# Patient Record
Sex: MALE | Race: WHITE | Employment: OTHER | URBAN - METROPOLITAN AREA
[De-identification: names, ages, dates, MRNs, and addresses within clinical notes are randomized per-mention and may not be internally consistent; named-entity substitution may affect disease eponyms.]

---

## 2017-01-31 ENCOUNTER — MED REC SCAN ONLY (OUTPATIENT)
Dept: FAMILY MEDICINE CLINIC | Facility: CLINIC | Age: 57
End: 2017-01-31

## 2017-02-10 ENCOUNTER — OFFICE VISIT (OUTPATIENT)
Dept: FAMILY MEDICINE CLINIC | Facility: CLINIC | Age: 57
End: 2017-02-10

## 2017-02-10 ENCOUNTER — APPOINTMENT (OUTPATIENT)
Dept: LAB | Age: 57
End: 2017-02-10
Attending: FAMILY MEDICINE
Payer: COMMERCIAL

## 2017-02-10 VITALS
HEART RATE: 96 BPM | RESPIRATION RATE: 16 BRPM | DIASTOLIC BLOOD PRESSURE: 72 MMHG | TEMPERATURE: 98 F | HEIGHT: 68 IN | BODY MASS INDEX: 38.84 KG/M2 | WEIGHT: 256.25 LBS | SYSTOLIC BLOOD PRESSURE: 110 MMHG

## 2017-02-10 DIAGNOSIS — K21.9 GASTROESOPHAGEAL REFLUX DISEASE WITHOUT ESOPHAGITIS: ICD-10-CM

## 2017-02-10 DIAGNOSIS — E29.1 MALE HYPOGONADISM: ICD-10-CM

## 2017-02-10 DIAGNOSIS — M23.91 INTERNAL DERANGEMENT OF KNEE, RIGHT: ICD-10-CM

## 2017-02-10 DIAGNOSIS — E11.9 CONTROLLED TYPE 2 DIABETES MELLITUS WITHOUT COMPLICATION, WITHOUT LONG-TERM CURRENT USE OF INSULIN (HCC): ICD-10-CM

## 2017-02-10 DIAGNOSIS — I10 BENIGN ESSENTIAL HYPERTENSION: Primary | ICD-10-CM

## 2017-02-10 DIAGNOSIS — I10 BENIGN ESSENTIAL HYPERTENSION: ICD-10-CM

## 2017-02-10 DIAGNOSIS — G47.33 OBSTRUCTIVE SLEEP APNEA: ICD-10-CM

## 2017-02-10 DIAGNOSIS — E78.49 FAMILIAL MULTIPLE LIPOPROTEIN-TYPE HYPERLIPIDEMIA: ICD-10-CM

## 2017-02-10 LAB
ALBUMIN SERPL-MCNC: 4.1 G/DL (ref 3.5–4.8)
ALP LIVER SERPL-CCNC: 43 U/L (ref 45–117)
ALT SERPL-CCNC: 31 U/L (ref 17–63)
AST SERPL-CCNC: 13 U/L (ref 15–41)
BILIRUB SERPL-MCNC: 0.4 MG/DL (ref 0.1–2)
BUN BLD-MCNC: 20 MG/DL (ref 8–20)
CALCIUM BLD-MCNC: 9.6 MG/DL (ref 8.3–10.3)
CHLORIDE: 104 MMOL/L (ref 101–111)
CO2: 28 MMOL/L (ref 22–32)
CREAT BLD-MCNC: 1.29 MG/DL (ref 0.7–1.3)
EST. AVERAGE GLUCOSE BLD GHB EST-MCNC: 131 MG/DL (ref 68–126)
GLUCOSE BLD-MCNC: 112 MG/DL (ref 70–99)
HBA1C MFR BLD HPLC: 6.2 % (ref ?–5.7)
M PROTEIN MFR SERPL ELPH: 7.3 G/DL (ref 6.1–8.3)
POTASSIUM SERPL-SCNC: 4.1 MMOL/L (ref 3.6–5.1)
SODIUM SERPL-SCNC: 140 MMOL/L (ref 136–144)
TESTOSTERONE: 184.9 NG/DL (ref 241–827)

## 2017-02-10 PROCEDURE — 84403 ASSAY OF TOTAL TESTOSTERONE: CPT

## 2017-02-10 PROCEDURE — 83036 HEMOGLOBIN GLYCOSYLATED A1C: CPT

## 2017-02-10 PROCEDURE — 80053 COMPREHEN METABOLIC PANEL: CPT

## 2017-02-10 PROCEDURE — 99214 OFFICE O/P EST MOD 30 MIN: CPT | Performed by: FAMILY MEDICINE

## 2017-02-10 RX ORDER — TESTOSTERONE 12.5 MG/1.25G
50 GEL TOPICAL DAILY
COMMUNITY
Start: 2016-07-22 | End: 2017-02-11

## 2017-02-10 RX ORDER — ESOMEPRAZOLE MAGNESIUM 40 MG/1
1 CAPSULE, DELAYED RELEASE ORAL DAILY
COMMUNITY
Start: 2015-03-20 | End: 2017-05-01

## 2017-02-10 RX ORDER — TAMSULOSIN HYDROCHLORIDE 0.4 MG/1
1 CAPSULE ORAL DAILY
COMMUNITY
Start: 2016-07-20 | End: 2017-08-14

## 2017-02-10 RX ORDER — TADALAFIL 20 MG/1
1 TABLET ORAL AS DIRECTED
COMMUNITY
Start: 2008-12-09 | End: 2017-06-17

## 2017-02-10 RX ORDER — METOPROLOL SUCCINATE 50 MG/1
1 TABLET, EXTENDED RELEASE ORAL DAILY
COMMUNITY
Start: 2008-08-26 | End: 2017-08-18

## 2017-02-10 RX ORDER — AMLODIPINE BESYLATE 10 MG/1
1 TABLET ORAL DAILY
COMMUNITY
Start: 2013-06-19 | End: 2017-12-06

## 2017-02-10 RX ORDER — VALSARTAN AND HYDROCHLOROTHIAZIDE 320; 12.5 MG/1; MG/1
1 TABLET, FILM COATED ORAL DAILY
COMMUNITY
Start: 2017-01-11 | End: 2017-12-06

## 2017-02-10 RX ORDER — LANCETS
3 EACH MISCELLANEOUS DAILY
COMMUNITY
Start: 2016-06-22 | End: 2017-08-14

## 2017-02-10 RX ORDER — SIMVASTATIN 40 MG
1 TABLET ORAL DAILY
COMMUNITY
Start: 2007-08-29 | End: 2017-12-06

## 2017-02-11 RX ORDER — TESTOSTERONE 12.5 MG/1.25G
50 GEL TOPICAL DAILY
Qty: 90 PACKAGE | Refills: 3 | Status: SHIPPED
Start: 2017-02-11 | End: 2017-08-21

## 2017-02-11 NOTE — PROGRESS NOTES
[address]  PROGRESS NOTE  Chief Complaint:   Patient presents with: Follow - Up      HPI:   This is a 61year old male coming in for follow-up of his diabetes. He has been feeling pretty good overall.     He said that during November a E11.9 Non Insulin Dependent Disp:  Rfl:    ACCU-CHEK FASTCLIX LANCETS Does not apply Misc 3 lancet by Finger stick route daily. 3x Daily Glucose Testing.   Dx: E11.9  Non Insulin Dependent Disp:  Rfl:    MetFORMIN HCl 500 MG Oral Tab Take 1 tablet by mouth or anxiety. ENDOCRINOLOGIC:  Denies excessive sweating, cold or heat intolerance, polyuria or polydipsia. He is not had any low blood sugars.     EXAM:   /72 mmHg  Pulse 96  Temp(Src) 98.1 °F (36.7 °C) (Temporal)  Resp 16  Ht 68\"  Wt 256 lb 4 oz  B his metformin regularly. I have recommended that we check his hemoglobin A1c today. - Hgba1c; Future    5. Male hypogonadism  He does have low testosterone. He has been using his testosterone supplement faithfully.   It is time to recheck his testosteron

## 2017-02-13 ENCOUNTER — TELEPHONE (OUTPATIENT)
Dept: FAMILY MEDICINE CLINIC | Facility: CLINIC | Age: 57
End: 2017-02-13

## 2017-02-13 NOTE — TELEPHONE ENCOUNTER
----- Message from Jyothi Macdonald MD sent at 2/11/2017 11:54 AM CST -----  Please call Chantel Knox. His hemoglobin A1c is normal at 6.2.   This is good news and means that his diabetes control is actually okay in spite of missing metformin for a little while

## 2017-02-25 ENCOUNTER — TELEPHONE (OUTPATIENT)
Dept: FAMILY MEDICINE CLINIC | Facility: CLINIC | Age: 57
End: 2017-02-25

## 2017-02-25 NOTE — TELEPHONE ENCOUNTER
Thomas Alex informed 1 Year refill Metformin sent on 2/20. [facility] shows Rx filled on 2/20. Informed will need to speak with pharmacy/agrees.   Chau Adams, 02/25/2017, 10:48 AM

## 2017-03-15 ENCOUNTER — MED REC SCAN ONLY (OUTPATIENT)
Dept: FAMILY MEDICINE CLINIC | Facility: CLINIC | Age: 57
End: 2017-03-15

## 2017-03-20 ENCOUNTER — TELEPHONE (OUTPATIENT)
Dept: FAMILY MEDICINE CLINIC | Facility: CLINIC | Age: 57
End: 2017-03-20

## 2017-03-21 ENCOUNTER — TELEPHONE (OUTPATIENT)
Dept: FAMILY MEDICINE CLINIC | Facility: CLINIC | Age: 57
End: 2017-03-21

## 2017-03-21 NOTE — TELEPHONE ENCOUNTER
M/L on VM Prior Approval for Medication Approved Yesterday. Informed can  Rx at Pharmacy Today.   Que Dye, 03/21/2017, 10:04 AM

## 2017-05-01 RX ORDER — ESOMEPRAZOLE MAGNESIUM 40 MG/1
CAPSULE, DELAYED RELEASE ORAL
Qty: 90 CAPSULE | Refills: 3 | Status: SHIPPED | OUTPATIENT
Start: 2017-05-01 | End: 2017-05-03

## 2017-05-03 RX ORDER — ESOMEPRAZOLE MAGNESIUM 40 MG/1
CAPSULE, DELAYED RELEASE ORAL
Qty: 30 CAPSULE | Refills: 11 | Status: SHIPPED | OUTPATIENT
Start: 2017-05-03 | End: 2018-01-19

## 2017-06-12 ENCOUNTER — TELEPHONE (OUTPATIENT)
Dept: FAMILY MEDICINE CLINIC | Facility: CLINIC | Age: 57
End: 2017-06-12

## 2017-06-12 DIAGNOSIS — I10 HTN (HYPERTENSION), BENIGN: ICD-10-CM

## 2017-06-12 DIAGNOSIS — E11.9 CONTROLLED TYPE 2 DIABETES MELLITUS WITHOUT COMPLICATION, WITHOUT LONG-TERM CURRENT USE OF INSULIN (HCC): Primary | ICD-10-CM

## 2017-06-17 RX ORDER — TADALAFIL 20 MG
TABLET ORAL
Qty: 10 TABLET | Refills: 11 | Status: SHIPPED | OUTPATIENT
Start: 2017-06-17 | End: 2017-06-18

## 2017-06-19 RX ORDER — TADALAFIL 20 MG/1
20 TABLET ORAL
Qty: 10 TABLET | Refills: 11 | Status: SHIPPED | OUTPATIENT
Start: 2017-06-19 | End: 2017-11-09

## 2017-06-19 NOTE — TELEPHONE ENCOUNTER
Future Appointments  Date Time Provider Chicho Gómez   8/9/2017 8:15 AM REF [place] [facility]   8/14/2017 2:00 PM Kirstin Balderrama MD [facility] [place] [facility], 06/19/2017, 7:18 AM

## 2017-08-02 NOTE — TELEPHONE ENCOUNTER
Future Appointments  Date Time Provider Chicho Dalia   8/9/2017 8:15 AM REF [place] [facility]   8/14/2017 2:00 PM Mesha Frankel MD [facility] [place] [facility]

## 2017-08-09 ENCOUNTER — LABORATORY ENCOUNTER (OUTPATIENT)
Dept: LAB | Age: 57
End: 2017-08-09
Attending: FAMILY MEDICINE
Payer: COMMERCIAL

## 2017-08-09 DIAGNOSIS — E11.9 CONTROLLED TYPE 2 DIABETES MELLITUS WITHOUT COMPLICATION, WITHOUT LONG-TERM CURRENT USE OF INSULIN (HCC): ICD-10-CM

## 2017-08-09 DIAGNOSIS — I10 HTN (HYPERTENSION), BENIGN: ICD-10-CM

## 2017-08-09 LAB
ALBUMIN SERPL-MCNC: 3.9 G/DL (ref 3.5–4.8)
ALP LIVER SERPL-CCNC: 55 U/L (ref 45–117)
ALT SERPL-CCNC: 37 U/L (ref 17–63)
AST SERPL-CCNC: 23 U/L (ref 15–41)
BASOPHILS # BLD AUTO: 0.02 X10(3) UL (ref 0–0.1)
BASOPHILS NFR BLD AUTO: 0.4 %
BILIRUB SERPL-MCNC: 0.7 MG/DL (ref 0.1–2)
BUN BLD-MCNC: 18 MG/DL (ref 8–20)
CALCIUM BLD-MCNC: 9.2 MG/DL (ref 8.3–10.3)
CHLORIDE: 104 MMOL/L (ref 101–111)
CHOLEST SMN-MCNC: 186 MG/DL (ref ?–200)
CO2: 29 MMOL/L (ref 22–32)
CREAT BLD-MCNC: 1.11 MG/DL (ref 0.7–1.3)
CREAT UR-SCNC: 298 MG/DL
EOSINOPHIL # BLD AUTO: 0.25 X10(3) UL (ref 0–0.3)
EOSINOPHIL NFR BLD AUTO: 5.2 %
ERYTHROCYTE [DISTWIDTH] IN BLOOD BY AUTOMATED COUNT: 12.8 % (ref 11.5–16)
EST. AVERAGE GLUCOSE BLD GHB EST-MCNC: 128 MG/DL (ref 68–126)
GLUCOSE BLD-MCNC: 126 MG/DL (ref 70–99)
HBA1C MFR BLD HPLC: 6.1 % (ref ?–5.7)
HCT VFR BLD AUTO: 45.3 % (ref 37–53)
HDLC SERPL-MCNC: 65 MG/DL (ref 45–?)
HDLC SERPL: 2.86 {RATIO} (ref ?–4.97)
HGB BLD-MCNC: 14.8 G/DL (ref 13–17)
IMMATURE GRANULOCYTE COUNT: 0.02 X10(3) UL (ref 0–1)
IMMATURE GRANULOCYTE RATIO %: 0.4 %
LDLC SERPL CALC-MCNC: 90 MG/DL (ref ?–130)
LDLC SERPL-MCNC: 31 MG/DL (ref 5–40)
LYMPHOCYTES # BLD AUTO: 1.26 X10(3) UL (ref 0.9–4)
LYMPHOCYTES NFR BLD AUTO: 26.4 %
M PROTEIN MFR SERPL ELPH: 7.4 G/DL (ref 6.1–8.3)
MCH RBC QN AUTO: 31.4 PG (ref 27–33.2)
MCHC RBC AUTO-ENTMCNC: 32.7 G/DL (ref 31–37)
MCV RBC AUTO: 96 FL (ref 80–99)
MICROALBUMIN UR-MCNC: 1.91 MG/DL
MICROALBUMIN/CREAT 24H UR-RTO: 6.4 UG/MG (ref ?–30)
MONOCYTES # BLD AUTO: 0.52 X10(3) UL (ref 0.1–0.6)
MONOCYTES NFR BLD AUTO: 10.9 %
NEUTROPHIL ABS PRELIM: 2.7 X10 (3) UL (ref 1.3–6.7)
NEUTROPHILS # BLD AUTO: 2.7 X10(3) UL (ref 1.3–6.7)
NEUTROPHILS NFR BLD AUTO: 56.7 %
NONHDLC SERPL-MCNC: 121 MG/DL (ref ?–130)
PLATELET # BLD AUTO: 172 10(3)UL (ref 150–450)
POTASSIUM SERPL-SCNC: 4 MMOL/L (ref 3.6–5.1)
RBC # BLD AUTO: 4.72 X10(6)UL (ref 4.3–5.7)
RED CELL DISTRIBUTION WIDTH-SD: 45.1 FL (ref 35.1–46.3)
SODIUM SERPL-SCNC: 142 MMOL/L (ref 136–144)
TRIGLYCERIDES: 155 MG/DL (ref ?–150)
TSI SER-ACNC: 1.19 MIU/ML (ref 0.35–5.5)
URIC ACID: 6.1 MG/DL (ref 2.4–8.7)
WBC # BLD AUTO: 4.8 X10(3) UL (ref 4–13)

## 2017-08-09 PROCEDURE — 80050 GENERAL HEALTH PANEL: CPT | Performed by: FAMILY MEDICINE

## 2017-08-09 PROCEDURE — 80061 LIPID PANEL: CPT | Performed by: FAMILY MEDICINE

## 2017-08-09 PROCEDURE — 36415 COLL VENOUS BLD VENIPUNCTURE: CPT | Performed by: FAMILY MEDICINE

## 2017-08-09 PROCEDURE — 82570 ASSAY OF URINE CREATININE: CPT | Performed by: FAMILY MEDICINE

## 2017-08-09 PROCEDURE — 84550 ASSAY OF BLOOD/URIC ACID: CPT | Performed by: FAMILY MEDICINE

## 2017-08-09 PROCEDURE — 82043 UR ALBUMIN QUANTITATIVE: CPT | Performed by: FAMILY MEDICINE

## 2017-08-09 PROCEDURE — 83036 HEMOGLOBIN GLYCOSYLATED A1C: CPT | Performed by: FAMILY MEDICINE

## 2017-08-14 ENCOUNTER — OFFICE VISIT (OUTPATIENT)
Dept: FAMILY MEDICINE CLINIC | Facility: CLINIC | Age: 57
End: 2017-08-14

## 2017-08-14 VITALS
HEIGHT: 69 IN | RESPIRATION RATE: 16 BRPM | WEIGHT: 259.81 LBS | TEMPERATURE: 98 F | DIASTOLIC BLOOD PRESSURE: 70 MMHG | HEART RATE: 84 BPM | BODY MASS INDEX: 38.48 KG/M2 | SYSTOLIC BLOOD PRESSURE: 122 MMHG

## 2017-08-14 DIAGNOSIS — E78.49 FAMILIAL MULTIPLE LIPOPROTEIN-TYPE HYPERLIPIDEMIA: ICD-10-CM

## 2017-08-14 DIAGNOSIS — K21.9 GASTROESOPHAGEAL REFLUX DISEASE WITHOUT ESOPHAGITIS: ICD-10-CM

## 2017-08-14 DIAGNOSIS — K40.90 NON-RECURRENT UNILATERAL INGUINAL HERNIA WITHOUT OBSTRUCTION OR GANGRENE: ICD-10-CM

## 2017-08-14 DIAGNOSIS — Z00.01 ENCOUNTER FOR GENERAL ADULT MEDICAL EXAMINATION WITH ABNORMAL FINDINGS: Primary | ICD-10-CM

## 2017-08-14 DIAGNOSIS — E29.1 MALE HYPOGONADISM: ICD-10-CM

## 2017-08-14 DIAGNOSIS — E11.9 CONTROLLED TYPE 2 DIABETES MELLITUS WITHOUT COMPLICATION, WITHOUT LONG-TERM CURRENT USE OF INSULIN (HCC): ICD-10-CM

## 2017-08-14 DIAGNOSIS — G47.33 OBSTRUCTIVE SLEEP APNEA: ICD-10-CM

## 2017-08-14 DIAGNOSIS — I10 BENIGN ESSENTIAL HYPERTENSION: ICD-10-CM

## 2017-08-14 PROCEDURE — 99214 OFFICE O/P EST MOD 30 MIN: CPT | Performed by: FAMILY MEDICINE

## 2017-08-14 PROCEDURE — 99396 PREV VISIT EST AGE 40-64: CPT | Performed by: FAMILY MEDICINE

## 2017-08-14 RX ORDER — LANCETS
3 EACH MISCELLANEOUS DAILY
Qty: 100 EACH | Refills: 3 | Status: SHIPPED | OUTPATIENT
Start: 2017-08-14

## 2017-08-14 RX ORDER — TAMSULOSIN HYDROCHLORIDE 0.4 MG/1
0.4 CAPSULE ORAL DAILY
Qty: 90 CAPSULE | Refills: 3 | Status: SHIPPED | OUTPATIENT
Start: 2017-08-14 | End: 2018-08-07

## 2017-08-14 NOTE — PROGRESS NOTES
[place] [facility] [place]  PROGRESS NOTE  Chief Complaint:   Patient presents with:  Physical: Medication Refill      HPI:   This is a 61year old male coming in for his annual physical.    He said he has been feeling really good overall.   He denies an Ur Random 298.00 mg/dL   Malb/Cre Calc 6.4 <=30.0 ug/mg   -ASSAY, THYROID STIM HORMONE   Result Value Ref Range   TSH 1.190 0.350 - 5.500 mIU/mL   -URIC ACID, SERUM   Result Value Ref Range   Uric Acid 6.1 2.4 - 8.7 mg/dL   -CBC W/ DIFFERENTIAL   Result Va mouth daily. Disp: 90 capsule Rfl: 3   Glucose Blood (CARLOZ CONTOUR NEXT TEST) In Vitro Strip 1x daily glucose testing.   Dx: E11.9  Non Insulin Dependent Disp: 100 each Rfl: 3   ACCU-CHEK FASTCLIX LANCETS Does not apply Misc 3 lancet by Finger stick route stiffness. NEUROLOGICAL:  Denies headache, seizures, dizziness, syncope, paralysis, ataxia, numbness or tingling in the extremities,change in bowel or bladder control. HEMATOLOGIC:  Denies anemia, bleeding or bruising.   LYMPHATICS:  Denies enlarged nodes Prostate normal size. Stool Hemoccult negative. EXTREMITIES:  No edema, no cyanosis, no clubbing, FROM, 2+ dorsalis pedis pulses bilaterally. NEURO:  No deficit, normal gait, strength and tone, sensory intact, normal reflexes.   Bilateral barefoot skin d glucose testing. Dx: E11.9  Non Insulin Dependent      ACCU-CHEK FASTCLIX LANCETS Does not apply Misc 100 each 3      Sig: 3 lancet by Finger stick route daily. 1x Daily Glucose Testing.   Dx: E11.9  Non Insulin Dependent             Patient/Caregiver Educ

## 2017-08-18 RX ORDER — METOPROLOL SUCCINATE 50 MG/1
TABLET, EXTENDED RELEASE ORAL
Qty: 90 TABLET | Refills: 3 | Status: SHIPPED | OUTPATIENT
Start: 2017-08-18 | End: 2017-08-19

## 2017-08-18 NOTE — TELEPHONE ENCOUNTER
Last Labs:  8/9/2017  Last OV:  8/14/2017  Last RF:  9/3/2016  No future appointments.   Nika Rush, 08/18/17, 1:01 PM

## 2017-08-21 RX ORDER — METOPROLOL SUCCINATE 50 MG/1
TABLET, EXTENDED RELEASE ORAL
Qty: 90 TABLET | Refills: 3 | Status: SHIPPED | OUTPATIENT
Start: 2017-08-21 | End: 2019-11-29 | Stop reason: DRUGHIGH

## 2017-08-21 RX ORDER — TESTOSTERONE 12.5 MG/1.25G
50 GEL TOPICAL DAILY
Qty: 90 PACKAGE | Refills: 3 | Status: SHIPPED
Start: 2017-08-21 | End: 2018-02-14

## 2017-08-23 ENCOUNTER — TELEPHONE (OUTPATIENT)
Dept: FAMILY MEDICINE CLINIC | Facility: CLINIC | Age: 57
End: 2017-08-23

## 2017-08-23 NOTE — TELEPHONE ENCOUNTER
Phone [facility]-Rx received Yesterday. Rx arrived Today-They will calling Patient. Informed Patient Rx sent to [facility].   Digna Batista, 08/23/17, 1:11 PM

## 2017-11-09 NOTE — TELEPHONE ENCOUNTER
Future appt:    Last Appointment:  8/14/2017    Cholesterol, Total (mg/dL)   Date Value   08/09/2017 186   ----------  HDL Cholesterol (mg/dL)   Date Value   08/09/2017 65   ----------  LDL Cholesterol (mg/dL)   Date Value   08/09/2017 90   ----------  Tri

## 2017-11-10 RX ORDER — TADALAFIL 20 MG
TABLET ORAL
Qty: 6 TABLET | Refills: 11 | Status: SHIPPED | OUTPATIENT
Start: 2017-11-10 | End: 2018-11-05

## 2017-12-06 RX ORDER — SIMVASTATIN 40 MG
TABLET ORAL
Qty: 90 TABLET | Refills: 1 | Status: SHIPPED | OUTPATIENT
Start: 2017-12-06 | End: 2018-05-28

## 2017-12-06 RX ORDER — AMLODIPINE BESYLATE 10 MG/1
TABLET ORAL
Qty: 90 TABLET | Refills: 1 | Status: SHIPPED | OUTPATIENT
Start: 2017-12-06 | End: 2018-05-28

## 2017-12-06 RX ORDER — VALSARTAN AND HYDROCHLOROTHIAZIDE 320; 12.5 MG/1; MG/1
TABLET, FILM COATED ORAL
Qty: 90 TABLET | Refills: 1 | Status: SHIPPED | OUTPATIENT
Start: 2017-12-06 | End: 2018-05-28

## 2018-01-18 ENCOUNTER — TELEPHONE (OUTPATIENT)
Dept: FAMILY MEDICINE CLINIC | Facility: CLINIC | Age: 58
End: 2018-01-18

## 2018-01-18 NOTE — TELEPHONE ENCOUNTER
Patient states he was told by the pharmacist at [facility] that Esomeprazole is not covered by [employer]. States they did not tell him what would be covered just that he needs a new script.   Informed patient I will contact [facility] and they notify Dr. Carmen Leiva

## 2018-01-18 NOTE — TELEPHONE ENCOUNTER
Needs different acid reflux medication, one that his insurance covers.   Please give him a call back

## 2018-01-19 RX ORDER — OMEPRAZOLE 20 MG/1
20 CAPSULE, DELAYED RELEASE ORAL
Qty: 90 CAPSULE | Refills: 3 | Status: SHIPPED | OUTPATIENT
Start: 2018-01-19 | End: 2018-12-29

## 2018-02-06 ENCOUNTER — OFFICE VISIT (OUTPATIENT)
Dept: FAMILY MEDICINE CLINIC | Facility: CLINIC | Age: 58
End: 2018-02-06

## 2018-02-06 ENCOUNTER — APPOINTMENT (OUTPATIENT)
Dept: LAB | Age: 58
End: 2018-02-06
Attending: FAMILY MEDICINE
Payer: COMMERCIAL

## 2018-02-06 VITALS
HEART RATE: 88 BPM | DIASTOLIC BLOOD PRESSURE: 90 MMHG | SYSTOLIC BLOOD PRESSURE: 148 MMHG | WEIGHT: 271.38 LBS | RESPIRATION RATE: 16 BRPM | HEIGHT: 68.5 IN | BODY MASS INDEX: 40.66 KG/M2 | TEMPERATURE: 98 F

## 2018-02-06 DIAGNOSIS — E29.1 MALE HYPOGONADISM: ICD-10-CM

## 2018-02-06 DIAGNOSIS — I10 BENIGN ESSENTIAL HYPERTENSION: ICD-10-CM

## 2018-02-06 DIAGNOSIS — I10 BENIGN ESSENTIAL HYPERTENSION: Primary | ICD-10-CM

## 2018-02-06 DIAGNOSIS — E11.9 CONTROLLED TYPE 2 DIABETES MELLITUS WITHOUT COMPLICATION, WITHOUT LONG-TERM CURRENT USE OF INSULIN (HCC): ICD-10-CM

## 2018-02-06 LAB
ALBUMIN SERPL-MCNC: 3.8 G/DL (ref 3.5–4.8)
ALP LIVER SERPL-CCNC: 50 U/L (ref 45–117)
ALT SERPL-CCNC: 63 U/L (ref 17–63)
AST SERPL-CCNC: 37 U/L (ref 15–41)
BILIRUB SERPL-MCNC: 0.5 MG/DL (ref 0.1–2)
BUN BLD-MCNC: 19 MG/DL (ref 8–20)
CALCIUM BLD-MCNC: 9.9 MG/DL (ref 8.3–10.3)
CHLORIDE: 102 MMOL/L (ref 101–111)
CO2: 30 MMOL/L (ref 22–32)
CREAT BLD-MCNC: 1.24 MG/DL (ref 0.7–1.3)
EST. AVERAGE GLUCOSE BLD GHB EST-MCNC: 140 MG/DL (ref 68–126)
GLUCOSE BLD-MCNC: 151 MG/DL (ref 70–99)
HBA1C MFR BLD HPLC: 6.5 % (ref ?–5.7)
M PROTEIN MFR SERPL ELPH: 7.6 G/DL (ref 6.1–8.3)
POTASSIUM SERPL-SCNC: 4.1 MMOL/L (ref 3.6–5.1)
SODIUM SERPL-SCNC: 140 MMOL/L (ref 136–144)
TESTOSTERONE: 214.3 NG/DL (ref 241–827)

## 2018-02-06 PROCEDURE — 84403 ASSAY OF TOTAL TESTOSTERONE: CPT | Performed by: FAMILY MEDICINE

## 2018-02-06 PROCEDURE — 83036 HEMOGLOBIN GLYCOSYLATED A1C: CPT | Performed by: FAMILY MEDICINE

## 2018-02-06 PROCEDURE — 80053 COMPREHEN METABOLIC PANEL: CPT | Performed by: FAMILY MEDICINE

## 2018-02-06 PROCEDURE — 99214 OFFICE O/P EST MOD 30 MIN: CPT | Performed by: FAMILY MEDICINE

## 2018-02-06 PROCEDURE — 36415 COLL VENOUS BLD VENIPUNCTURE: CPT | Performed by: FAMILY MEDICINE

## 2018-02-06 NOTE — PROGRESS NOTES
[facility] [place]  PROGRESS NOTE  Chief Complaint:   Patient presents with:  Physical      HPI:   This is a 61year old male coming in for my diabetes follow-up rather than a physical.  He said he would rather do his physical every year in Augu 4.30 - 5.70 x10(6)uL   HGB 14.8 13.0 - 17.0 g/dL   HCT 45.3 37.0 - 53.0 %   .0 150.0 - 450.0 10(3)uL   MCV 96.0 80.0 - 99.0 fL   MCH 31.4 27.0 - 33.2 pg   MCHC 32.7 31.0 - 37.0 g/dL   RDW 12.8 11.5 - 16.0 %   RDW-SD 45.1 35.1 - 46.3 fL   Neutrophil TABLET BY MOUTH EVERY DAY Disp: 90 tablet Rfl: 1   CIALIS 20 MG Oral Tab TAKE AS DIRECTED Disp: 6 tablet Rfl: 11   METOPROLOL SUCCINATE ER 50 MG Oral Tablet 24 Hr TAKE ONE TABLET BY MOUTH EVERY DAY Disp: 90 tablet Rfl: 3   Testosterone (ANDROGEL) 50 MG/5GM depressed now. He feels like he is doing pretty well overall. ENDOCRINOLOGIC:  Denies excessive sweating, cold or heat intolerance, polyuria or polydipsia. ALLERGIES:  Denies allergic response, history of asthma, sneezing, hives, eczema or rhinitis. hypertension  His blood pressure is elevated today. He has been taking his blood pressure medicines faithfully although he said he did skip one dose of the metformin today. Plan: We will continue the same blood pressure medicines for now.   If his blood p hypertension     Male hypogonadism     Inguinal hernia     Internal derangement of knee     Obstructive sleep apnea     Calculus of ureter     S/P vasectomy      Ian Montejo MD  2/6/2018  8:25 AM

## 2018-02-07 ENCOUNTER — TELEPHONE (OUTPATIENT)
Dept: FAMILY MEDICINE CLINIC | Facility: CLINIC | Age: 58
End: 2018-02-07

## 2018-02-07 NOTE — TELEPHONE ENCOUNTER
Patient informed of below. Expressed understanding. Patient asking since He is using Testosterone to get His levels up, does  This discourage His body from making Testosterone? Please advise.   Eliz Malin, 02/07/18, 4:15 PM

## 2018-02-07 NOTE — TELEPHONE ENCOUNTER
Great question. Using a testosterone supplement in high doses can decrease the bodies natural production of testosterone.   However if the testosterone level is low and we use a supplement at a low dose, it usually does not decrease the production that is

## 2018-02-07 NOTE — TELEPHONE ENCOUNTER
----- Message from Grisel Carey MD sent at 2/7/2018 12:42 PM CST -----  Please call Narciso Pallas. His hemoglobin A1c is 6.5. This means that his diabetes is under good control. His testosterone level is 214. That is higher than before.   Previously it wa

## 2018-02-14 RX ORDER — TESTOSTERONE 12.5 MG/1.25G
50 GEL TOPICAL DAILY
Qty: 90 PACKAGE | Refills: 3 | Status: SHIPPED
Start: 2018-02-14 | End: 2018-08-08

## 2018-02-14 NOTE — TELEPHONE ENCOUNTER
Future appt:    Last Appointment:  2/6/2018    Cholesterol, Total (mg/dL)   Date Value   08/09/2017 186   ----------  HDL Cholesterol (mg/dL)   Date Value   08/09/2017 65   ----------  LDL Cholesterol (mg/dL)   Date Value   08/09/2017 90   ----------  Trig

## 2018-03-02 ENCOUNTER — OFFICE VISIT (OUTPATIENT)
Dept: FAMILY MEDICINE CLINIC | Facility: CLINIC | Age: 58
End: 2018-03-02

## 2018-03-02 ENCOUNTER — TELEPHONE (OUTPATIENT)
Dept: FAMILY MEDICINE CLINIC | Facility: CLINIC | Age: 58
End: 2018-03-02

## 2018-03-02 VITALS
WEIGHT: 274 LBS | HEART RATE: 104 BPM | BODY MASS INDEX: 41 KG/M2 | OXYGEN SATURATION: 97 % | TEMPERATURE: 99 F | DIASTOLIC BLOOD PRESSURE: 86 MMHG | SYSTOLIC BLOOD PRESSURE: 128 MMHG

## 2018-03-02 DIAGNOSIS — G47.33 OSA (OBSTRUCTIVE SLEEP APNEA): Primary | ICD-10-CM

## 2018-03-02 DIAGNOSIS — J20.9 ACUTE BRONCHITIS, UNSPECIFIED ORGANISM: Primary | ICD-10-CM

## 2018-03-02 PROCEDURE — 99213 OFFICE O/P EST LOW 20 MIN: CPT | Performed by: NURSE PRACTITIONER

## 2018-03-02 RX ORDER — AZITHROMYCIN 250 MG/1
TABLET, FILM COATED ORAL
Qty: 6 TABLET | Refills: 0 | Status: SHIPPED | OUTPATIENT
Start: 2018-03-02 | End: 2018-03-07

## 2018-03-02 NOTE — PROGRESS NOTES
Chief complaint:  Patient presents with:  Cough: congesiton    HPI:   Wilberto Torres is a 61year old male who presents for upper respiratory symptoms for  2  months. C/o: congestion, cough. States that s/s have been worsening lately.  Thinks it started wi CIALIS 20 MG Oral Tab TAKE AS DIRECTED Disp: 6 tablet Rfl: 11      Past Medical History:   Diagnosis Date   • Arthritis    • Calculus of kidney    • Diabetes ([facility] [address]    • Esophageal reflux    • Essential hypertension    • Hyperlipidemia    • Osteoarthritis PSYCH: A&Ox3, appropriate affect    ASSESSMENT AND PLAN:   Santhosh Story is a 61year old male who presents with Cough (congesiton).  Symptoms are consistent with:      ASSESSMENT:  Acute bronchitis, unspecified organism  (primary encounter diagnosis) · You may use over-the-counter medicines to control fever or pain, unless another medicine was prescribed.  If you have chronic liver or kidney disease or have ever had a stomach ulcer or gastrointestinal bleeding, talk with your healthcare provider before · Trouble breathing, wheezing, or pain with breathing  Date Last Reviewed: 9/13/2015  © 2729-8020 [employer]. [address] [facility] [place], [address] [place] [address]. All rights reserved.  This information is not intended as a substitute for professional m

## 2018-03-03 NOTE — TELEPHONE ENCOUNTER
Patient needs CPAP supplies through [facility]  Has not been seen in over a year  Made appt for patient to see Beau Ibrahim next week for f/up on his cpap  Per Dr. Lindsay Hunt ok to send script to [facility] for supplies    Wiliam Moore, 03/03/18, 12:44 PM

## 2018-03-07 ENCOUNTER — OFFICE VISIT (OUTPATIENT)
Dept: FAMILY MEDICINE CLINIC | Facility: CLINIC | Age: 58
End: 2018-03-07

## 2018-03-07 VITALS
DIASTOLIC BLOOD PRESSURE: 88 MMHG | RESPIRATION RATE: 20 BRPM | BODY MASS INDEX: 40 KG/M2 | HEART RATE: 88 BPM | SYSTOLIC BLOOD PRESSURE: 126 MMHG | WEIGHT: 267 LBS | TEMPERATURE: 98 F | HEIGHT: 68.5 IN

## 2018-03-07 DIAGNOSIS — G47.33 OBSTRUCTIVE SLEEP APNEA: Primary | ICD-10-CM

## 2018-03-07 PROCEDURE — 99213 OFFICE O/P EST LOW 20 MIN: CPT | Performed by: NURSE PRACTITIONER

## 2018-03-07 NOTE — PROGRESS NOTES
[facility]  [facility]        HPI:   This is a 61year old male coming in for Patient presents with:  Obstructive Sleep Apnea (THEODORA): Sleep compliance f/u      HPI: Patient is present to review his CPAP compliance.   States that h MOUTH TWICE A DAY Disp: 180 tablet Rfl: 3   Testosterone (ANDROGEL) 50 MG/5GM (1%) Transdermal Gel Apply 50 mg topically daily.  Disp: 90 Package Rfl: 3   omeprazole 20 MG Oral Capsule Delayed Release Take 1 capsule (20 mg total) by mouth every morning befo Constitutional: Negative. HENT: Negative. Eyes: Negative. Respiratory: Negative. Cardiovascular: Negative. Musculoskeletal: Negative. Skin: Negative. Neurological: Negative. Psychiatric/Behavioral: Negative.         EXAM:   / CPAP.  Recheck in 6 months, sooner if problems. Warned if still with sleep apnea and not using CPAP has 7 fold increased risk and heart attack, stroke, abnormal heart rhythm, and death. Increased risk of driving accidents.   Advised to refrain from driv

## 2018-03-07 NOTE — PATIENT INSTRUCTIONS
Continue using CPAP. Recheck in 6 months, sooner if problems. Warned if still with sleep apnea and not using CPAP has 7 fold increased risk and heart attack, stroke, abnormal heart rhythm, and death. Increased risk of driving accidents.   Advised to re

## 2018-04-04 ENCOUNTER — MED REC SCAN ONLY (OUTPATIENT)
Dept: FAMILY MEDICINE CLINIC | Facility: CLINIC | Age: 58
End: 2018-04-04

## 2018-05-15 ENCOUNTER — TELEPHONE (OUTPATIENT)
Dept: FAMILY MEDICINE CLINIC | Facility: CLINIC | Age: 58
End: 2018-05-15

## 2018-05-15 NOTE — TELEPHONE ENCOUNTER
Patient states He has started with explosive diarrhea. States He did start taking Imodium. Advised to follow directions on Imodium and BRAT diet tonight. Advise to call tomorrow if Sx have not eased up/agreed.   Mike Lucero, 05/15/18, 4:35 PM

## 2018-05-29 RX ORDER — SIMVASTATIN 40 MG
TABLET ORAL
Qty: 90 TABLET | Refills: 3 | Status: SHIPPED | OUTPATIENT
Start: 2018-05-29 | End: 2019-04-30

## 2018-05-29 RX ORDER — VALSARTAN AND HYDROCHLOROTHIAZIDE 320; 12.5 MG/1; MG/1
TABLET, FILM COATED ORAL
Qty: 90 TABLET | Refills: 3 | Status: SHIPPED | OUTPATIENT
Start: 2018-05-29 | End: 2018-07-25

## 2018-05-29 RX ORDER — AMLODIPINE BESYLATE 10 MG/1
TABLET ORAL
Qty: 90 TABLET | Refills: 3 | Status: SHIPPED | OUTPATIENT
Start: 2018-05-29 | End: 2019-04-30

## 2018-07-09 ENCOUNTER — TELEPHONE (OUTPATIENT)
Dept: FAMILY MEDICINE CLINIC | Facility: CLINIC | Age: 58
End: 2018-07-09

## 2018-07-25 ENCOUNTER — TELEPHONE (OUTPATIENT)
Dept: FAMILY MEDICINE CLINIC | Facility: CLINIC | Age: 58
End: 2018-07-25

## 2018-07-25 RX ORDER — OLMESARTAN MEDOXOMIL AND HYDROCHLOROTHIAZIDE 40/12.5 40; 12.5 MG/1; MG/1
1 TABLET ORAL DAILY
Qty: 90 TABLET | Refills: 3 | Status: SHIPPED | OUTPATIENT
Start: 2018-07-25 | End: 2018-08-15

## 2018-07-25 NOTE — TELEPHONE ENCOUNTER
Patient informed of below. Expressed understanding. Patient states His blood pressure has been running 150/90 range. Advised to keep record of his readings.   Has upcoming appt mid august.  Advised to call if readings do not come down for sooner appt/a

## 2018-07-30 ENCOUNTER — TELEPHONE (OUTPATIENT)
Dept: FAMILY MEDICINE CLINIC | Facility: CLINIC | Age: 58
End: 2018-07-30

## 2018-07-30 NOTE — TELEPHONE ENCOUNTER
These blood pressure readings are not ideal but are acceptable. Plan: Continue the same medications for now. Please bring a log of blood pressure numbers and at the next visit on August 15.

## 2018-07-30 NOTE — TELEPHONE ENCOUNTER
Patient calling with B/P readings being on new prescription. Wed/Thurs:  150/80,  Fri:  140/80,  Sat/Sun:  130/upper 70's. Today 150/90. Please advise.   Dede Hernandez, 07/30/18, 11:00 AM

## 2018-08-07 RX ORDER — TAMSULOSIN HYDROCHLORIDE 0.4 MG/1
CAPSULE ORAL
Qty: 90 CAPSULE | Refills: 3 | Status: SHIPPED | OUTPATIENT
Start: 2018-08-07 | End: 2019-07-02

## 2018-08-07 RX ORDER — METOPROLOL SUCCINATE 50 MG/1
TABLET, EXTENDED RELEASE ORAL
Qty: 90 TABLET | Refills: 3 | Status: SHIPPED | OUTPATIENT
Start: 2018-08-07 | End: 2018-08-15

## 2018-08-07 NOTE — TELEPHONE ENCOUNTER
Future appt:     Your appointments     Date & Time Appointment Department [facility])    Aug 10, 2018  8:45 AM CDT Laboratory Visit with REF Mitzi [facility] ([facility])    Aug 15, 2018  8:00 AM CDT Physical - Established Patient with T

## 2018-08-08 RX ORDER — TESTOSTERONE 12.5 MG/1.25G
50 GEL TOPICAL DAILY
Qty: 90 PACKAGE | Refills: 3 | Status: SHIPPED
Start: 2018-08-08 | End: 2019-01-23

## 2018-08-08 NOTE — TELEPHONE ENCOUNTER
Future appt:     Your appointments     Date & Time Appointment Department [facility])    Aug 10, 2018  8:45 AM CDT Laboratory Visit with REF [facility]ia [facility] ([facility])    Aug 15, 2018  8:00 AM CDT Physical - Established Patient with T

## 2018-08-10 ENCOUNTER — LABORATORY ENCOUNTER (OUTPATIENT)
Dept: LAB | Age: 58
End: 2018-08-10
Attending: FAMILY MEDICINE
Payer: COMMERCIAL

## 2018-08-10 DIAGNOSIS — I10 BENIGN ESSENTIAL HYPERTENSION: ICD-10-CM

## 2018-08-10 DIAGNOSIS — E29.1 MALE HYPOGONADISM: ICD-10-CM

## 2018-08-10 DIAGNOSIS — E11.9 CONTROLLED TYPE 2 DIABETES MELLITUS WITHOUT COMPLICATION, WITHOUT LONG-TERM CURRENT USE OF INSULIN (HCC): ICD-10-CM

## 2018-08-10 LAB
ALBUMIN SERPL-MCNC: 4 G/DL (ref 3.5–4.8)
ALBUMIN/GLOB SERPL: 1.2 {RATIO} (ref 1–2)
ALP LIVER SERPL-CCNC: 48 U/L (ref 45–117)
ALT SERPL-CCNC: 43 U/L (ref 17–63)
ANION GAP SERPL CALC-SCNC: 7 MMOL/L (ref 0–18)
AST SERPL-CCNC: 34 U/L (ref 15–41)
BASOPHILS # BLD AUTO: 0.02 X10(3) UL (ref 0–0.1)
BASOPHILS NFR BLD AUTO: 0.5 %
BILIRUB SERPL-MCNC: 0.9 MG/DL (ref 0.1–2)
BUN BLD-MCNC: 20 MG/DL (ref 8–20)
BUN/CREAT SERPL: 16.1 (ref 10–20)
CALCIUM BLD-MCNC: 9.5 MG/DL (ref 8.3–10.3)
CHLORIDE SERPL-SCNC: 103 MMOL/L (ref 101–111)
CHOLEST SMN-MCNC: 196 MG/DL (ref ?–200)
CO2 SERPL-SCNC: 28 MMOL/L (ref 22–32)
CREAT BLD-MCNC: 1.24 MG/DL (ref 0.7–1.3)
CREAT UR-SCNC: 240 MG/DL
EOSINOPHIL # BLD AUTO: 0.22 X10(3) UL (ref 0–0.3)
EOSINOPHIL NFR BLD AUTO: 5.3 %
ERYTHROCYTE [DISTWIDTH] IN BLOOD BY AUTOMATED COUNT: 12.7 % (ref 11.5–16)
EST. AVERAGE GLUCOSE BLD GHB EST-MCNC: 140 MG/DL (ref 68–126)
GLOBULIN PLAS-MCNC: 3.3 G/DL (ref 2.5–3.7)
GLUCOSE BLD-MCNC: 117 MG/DL (ref 70–99)
HBA1C MFR BLD HPLC: 6.5 % (ref ?–5.7)
HCT VFR BLD AUTO: 46.6 % (ref 37–53)
HDLC SERPL-MCNC: 55 MG/DL (ref 40–59)
HGB BLD-MCNC: 15.5 G/DL (ref 13–17)
IMMATURE GRANULOCYTE COUNT: 0.02 X10(3) UL (ref 0–1)
IMMATURE GRANULOCYTE RATIO %: 0.5 %
LDLC SERPL CALC-MCNC: 102 MG/DL (ref ?–100)
LYMPHOCYTES # BLD AUTO: 1.24 X10(3) UL (ref 0.9–4)
LYMPHOCYTES NFR BLD AUTO: 29.8 %
M PROTEIN MFR SERPL ELPH: 7.3 G/DL (ref 6.1–8.3)
MCH RBC QN AUTO: 33 PG (ref 27–33.2)
MCHC RBC AUTO-ENTMCNC: 33.3 G/DL (ref 31–37)
MCV RBC AUTO: 99.1 FL (ref 80–99)
MICROALBUMIN UR-MCNC: 2.47 MG/DL
MICROALBUMIN/CREAT 24H UR-RTO: 10.3 UG/MG (ref ?–30)
MONOCYTES # BLD AUTO: 0.47 X10(3) UL (ref 0.1–1)
MONOCYTES NFR BLD AUTO: 11.3 %
NEUTROPHIL ABS PRELIM: 2.19 X10 (3) UL (ref 1.3–6.7)
NEUTROPHILS # BLD AUTO: 2.19 X10(3) UL (ref 1.3–6.7)
NEUTROPHILS NFR BLD AUTO: 52.6 %
NONHDLC SERPL-MCNC: 141 MG/DL (ref ?–130)
OSMOLALITY SERPL CALC.SUM OF ELEC: 290 MOSM/KG (ref 275–295)
PLATELET # BLD AUTO: 166 10(3)UL (ref 150–450)
POTASSIUM SERPL-SCNC: 4.2 MMOL/L (ref 3.6–5.1)
RBC # BLD AUTO: 4.7 X10(6)UL (ref 4.3–5.7)
RED CELL DISTRIBUTION WIDTH-SD: 46.5 FL (ref 35.1–46.3)
SODIUM SERPL-SCNC: 138 MMOL/L (ref 136–144)
TRIGL SERPL-MCNC: 195 MG/DL (ref 30–149)
TSI SER-ACNC: 1.23 MIU/ML (ref 0.35–5.5)
URATE SERPL-MCNC: 6.9 MG/DL (ref 2.4–8.7)
VLDLC SERPL CALC-MCNC: 39 MG/DL (ref 0–30)
WBC # BLD AUTO: 4.2 X10(3) UL (ref 4–13)

## 2018-08-10 PROCEDURE — 82570 ASSAY OF URINE CREATININE: CPT | Performed by: FAMILY MEDICINE

## 2018-08-10 PROCEDURE — 80050 GENERAL HEALTH PANEL: CPT | Performed by: FAMILY MEDICINE

## 2018-08-10 PROCEDURE — 80061 LIPID PANEL: CPT | Performed by: FAMILY MEDICINE

## 2018-08-10 PROCEDURE — 84550 ASSAY OF BLOOD/URIC ACID: CPT | Performed by: FAMILY MEDICINE

## 2018-08-10 PROCEDURE — 83036 HEMOGLOBIN GLYCOSYLATED A1C: CPT | Performed by: FAMILY MEDICINE

## 2018-08-10 PROCEDURE — 82043 UR ALBUMIN QUANTITATIVE: CPT | Performed by: FAMILY MEDICINE

## 2018-08-10 PROCEDURE — 36415 COLL VENOUS BLD VENIPUNCTURE: CPT | Performed by: FAMILY MEDICINE

## 2018-08-15 ENCOUNTER — OFFICE VISIT (OUTPATIENT)
Dept: FAMILY MEDICINE CLINIC | Facility: CLINIC | Age: 58
End: 2018-08-15
Payer: COMMERCIAL

## 2018-08-15 VITALS
BODY MASS INDEX: 40.02 KG/M2 | SYSTOLIC BLOOD PRESSURE: 136 MMHG | DIASTOLIC BLOOD PRESSURE: 80 MMHG | RESPIRATION RATE: 16 BRPM | HEART RATE: 84 BPM | HEIGHT: 68.5 IN | TEMPERATURE: 98 F | WEIGHT: 267.13 LBS

## 2018-08-15 DIAGNOSIS — E78.49 FAMILIAL MULTIPLE LIPOPROTEIN-TYPE HYPERLIPIDEMIA: ICD-10-CM

## 2018-08-15 DIAGNOSIS — M15.9 PRIMARY OSTEOARTHRITIS INVOLVING MULTIPLE JOINTS: ICD-10-CM

## 2018-08-15 DIAGNOSIS — E11.9 CONTROLLED TYPE 2 DIABETES MELLITUS WITHOUT COMPLICATION, WITHOUT LONG-TERM CURRENT USE OF INSULIN (HCC): ICD-10-CM

## 2018-08-15 DIAGNOSIS — N20.1 CALCULUS OF URETER: ICD-10-CM

## 2018-08-15 DIAGNOSIS — I10 BENIGN ESSENTIAL HYPERTENSION: ICD-10-CM

## 2018-08-15 DIAGNOSIS — Z00.01 ENCOUNTER FOR GENERAL ADULT MEDICAL EXAMINATION WITH ABNORMAL FINDINGS: Primary | ICD-10-CM

## 2018-08-15 DIAGNOSIS — K21.9 GASTROESOPHAGEAL REFLUX DISEASE WITHOUT ESOPHAGITIS: ICD-10-CM

## 2018-08-15 DIAGNOSIS — E29.1 MALE HYPOGONADISM: ICD-10-CM

## 2018-08-15 DIAGNOSIS — G47.33 OBSTRUCTIVE SLEEP APNEA: ICD-10-CM

## 2018-08-15 PROBLEM — M19.90 OSTEOARTHRITIS: Status: ACTIVE | Noted: 2018-08-15

## 2018-08-15 PROCEDURE — 99214 OFFICE O/P EST MOD 30 MIN: CPT | Performed by: FAMILY MEDICINE

## 2018-08-15 PROCEDURE — 99396 PREV VISIT EST AGE 40-64: CPT | Performed by: FAMILY MEDICINE

## 2018-08-15 RX ORDER — IRBESARTAN AND HYDROCHLOROTHIAZIDE 300; 12.5 MG/1; MG/1
1 TABLET, FILM COATED ORAL DAILY
Qty: 30 TABLET | Refills: 6 | Status: SHIPPED | OUTPATIENT
Start: 2018-08-15 | End: 2018-08-24

## 2018-08-15 NOTE — PROGRESS NOTES
[place] [facility] [place]  PROGRESS NOTE  Chief Complaint:   Patient presents with:  Physical      HPI:   This is a 61year old male coming in for his annual physical.    He said overall he is feeling good. He said he is not feeling depressed.   He sofie Result Value Ref Range   Cholesterol, Total 196 <200 mg/dL   HDL Cholesterol 55 40 - 59 mg/dL   Triglycerides 195 (H) 30 - 149 mg/dL   LDL Cholesterol 102 (H) <100 mg/dL   VLDL 39 (H) 0 - 30 mg/dL   Non HDL Chol 141 (H) <130 mg/dL   -MICROALB/CREAT RATIO Shots of liquor: 10 - 12 per week     Comment: 2 drinks daily    Family History:  Family History   Problem Relation Age of Onset   • Hypertension Father    • Hypertension Mother      Allergies:    No Known Allergies        Current Meds:    Current Outpatie discomfort, palpitations, edema, dyspnea on exertion or at rest.  RESPIRATORY:  Denies shortness of breath, wheezing, cough or sputum. GASTROINTESTINAL:  Denies abdominal pain, nausea, vomiting, constipation, diarrhea, or blood in stool.   MUSCULOSKELETAL: rales/rhonchi/wheezing. ABDOMEN:  Soft, nondistended, nontender, bowel sounds normal in all 4 quadrants, no masses, no hepatosplenomegaly. BACK: No tenderness, no spasm, SLR test negative, FROM.   EXTREMITIES:  No edema, no cyanosis, no clubbing, FROM, 2+ for it. He is due for another diabetes check in 6 months. No changes in his diabetes medicines now. We have switched his blood pressure medicine.       Meds & Refills for this Visit:  Signed Prescriptions Disp Refills    Irbesartan-Hydrochlorothiazide

## 2018-08-24 ENCOUNTER — TELEPHONE (OUTPATIENT)
Dept: FAMILY MEDICINE CLINIC | Facility: CLINIC | Age: 58
End: 2018-08-24

## 2018-08-24 RX ORDER — TELMISARTAN AND HYDROCHLORTHIAZIDE 80; 25 MG/1; MG/1
1 TABLET ORAL DAILY
Qty: 30 TABLET | Refills: 5 | Status: SHIPPED | OUTPATIENT
Start: 2018-08-24 | End: 2018-11-30

## 2018-08-24 NOTE — TELEPHONE ENCOUNTER
Patient states his blood pressure is still running 150/160's-80's90's. Please advise.   Rian Dang, 08/24/18, 2:34 PM

## 2018-10-27 ENCOUNTER — OFFICE VISIT (OUTPATIENT)
Dept: FAMILY MEDICINE CLINIC | Facility: CLINIC | Age: 58
End: 2018-10-27
Payer: COMMERCIAL

## 2018-10-27 VITALS
RESPIRATION RATE: 20 BRPM | HEART RATE: 96 BPM | TEMPERATURE: 98 F | HEIGHT: 68.5 IN | DIASTOLIC BLOOD PRESSURE: 74 MMHG | WEIGHT: 263.19 LBS | SYSTOLIC BLOOD PRESSURE: 122 MMHG | BODY MASS INDEX: 39.43 KG/M2

## 2018-10-27 DIAGNOSIS — H61.23 BILATERAL IMPACTED CERUMEN: Primary | ICD-10-CM

## 2018-10-27 PROCEDURE — 99212 OFFICE O/P EST SF 10 MIN: CPT | Performed by: NURSE PRACTITIONER

## 2018-10-27 PROCEDURE — 69209 REMOVE IMPACTED EAR WAX UNI: CPT | Performed by: NURSE PRACTITIONER

## 2018-10-27 NOTE — PROGRESS NOTES
HPI:    Patient ID: Mary Santiago is a 60year old male. HPI     Patient is present with concern about cerumen buildup in his ears bilaterally states that he is having trouble hearing.   States his hearing has never been normal to his left ear since hi explained to patient regarding flushing ears including risk of TM rupture. Patient wishes to proceed. Canal soaked with hydrogen peroxide and then flushed with warm water. Small to moderate amount of cerumen removed bilaterally.   TMs intact bilaterally,

## 2018-11-06 RX ORDER — TADALAFIL 20 MG
TABLET ORAL
Qty: 6 TABLET | Refills: 11 | Status: SHIPPED | OUTPATIENT
Start: 2018-11-06 | End: 2019-02-18

## 2018-11-06 NOTE — TELEPHONE ENCOUNTER
Future appt:     Last Appointment:  8/15/2018; Return in about 6 months (around 2/15/2019).      Cholesterol, Total (mg/dL)   Date Value   08/10/2018 196     HDL Cholesterol (mg/dL)   Date Value   08/10/2018 55     LDL Cholesterol (mg/dL)   Date Value   08/

## 2018-11-30 ENCOUNTER — TELEPHONE (OUTPATIENT)
Dept: FAMILY MEDICINE CLINIC | Facility: CLINIC | Age: 58
End: 2018-11-30

## 2018-11-30 RX ORDER — CANDESARTAN CILEXETIL AND HYDROCHLOROTHIAZIDE 32; 12.5 MG/1; MG/1
1 TABLET ORAL DAILY
Qty: 30 TABLET | Refills: 5 | Status: SHIPPED | OUTPATIENT
Start: 2018-11-30 | End: 2019-04-30

## 2018-11-30 NOTE — TELEPHONE ENCOUNTER
Please stop taking the telmisartan. We have 1 more medicine in this family to try. It is a medicine called candesartan. I will send in a new prescription for it.   If this 1 is not effective then we will moved to a completely different family of blood pr

## 2018-11-30 NOTE — TELEPHONE ENCOUNTER
Not happy with his blood pressure numbers, maybe change his medication, top # high.   Please give him a call back

## 2018-11-30 NOTE — TELEPHONE ENCOUNTER
Patient states His B/P numbers have been 150/160's/ 70/80's. States He can hear his heart pounding in his ears at night. Questions if B/P Medication should be changed? Please advise.   Luana Granados, 11/30/18, 2:25 PM

## 2018-12-05 RX ORDER — TADALAFIL 20 MG
TABLET ORAL
Qty: 6 TABLET | Refills: 11 | Status: SHIPPED | OUTPATIENT
Start: 2018-12-05 | End: 2019-02-18 | Stop reason: ALTCHOICE

## 2018-12-05 NOTE — TELEPHONE ENCOUNTER
Future appt:    Last Appointment:  8/15/2018  Cholesterol, Total (mg/dL)   Date Value   08/10/2018 196     HDL Cholesterol (mg/dL)   Date Value   08/10/2018 55     LDL Cholesterol (mg/dL)   Date Value   08/10/2018 102 (H)     Triglycerides (mg/dL)   Date V

## 2018-12-31 RX ORDER — OMEPRAZOLE 20 MG/1
CAPSULE, DELAYED RELEASE ORAL
Qty: 90 CAPSULE | Refills: 3 | Status: SHIPPED | OUTPATIENT
Start: 2018-12-31 | End: 2020-02-17

## 2019-01-14 ENCOUNTER — TELEPHONE (OUTPATIENT)
Dept: FAMILY MEDICINE CLINIC | Facility: CLINIC | Age: 59
End: 2019-01-14

## 2019-01-14 NOTE — TELEPHONE ENCOUNTER
Patient called states that he is having pain that feels like a kidney stone- states he has had one in the past- states it is to his flank - not related movement, he denies fever, denies nausea/vomiting.    States he is wondering if he NEEDS to go to the ER

## 2019-01-22 RX ORDER — OMEPRAZOLE 20 MG/1
CAPSULE, DELAYED RELEASE ORAL
Qty: 90 CAPSULE | Refills: 3 | Status: SHIPPED | OUTPATIENT
Start: 2019-01-22 | End: 2019-02-18

## 2019-01-22 NOTE — TELEPHONE ENCOUNTER
Future appt:     Your appointments     Date & Time Appointment Department [facility])    Feb 18, 2019  9:30 AM CST Follow up with Tc Walden MD [address], [place] ([address], [facility]

## 2019-01-23 RX ORDER — TESTOSTERONE 12.5 MG/1.25G
50 GEL TOPICAL DAILY
Qty: 90 PACKAGE | Refills: 3 | Status: SHIPPED | OUTPATIENT
Start: 2019-01-23 | End: 2019-08-20

## 2019-01-23 NOTE — TELEPHONE ENCOUNTER
Future appt:     Your appointments     Date & Time Appointment Department [facility])    Feb 18, 2019  9:30 AM CST Follow up with Caridad Sal MD [address] [facility] 05, [address]

## 2019-02-11 NOTE — TELEPHONE ENCOUNTER
Future appt:     Your appointments     Date & Time Appointment Department [facility])    Feb 18, 2019  9:30 AM CST Follow up with Cahu Deal MD 25 [facility] 21, [address]

## 2019-02-18 ENCOUNTER — APPOINTMENT (OUTPATIENT)
Dept: LAB | Age: 59
End: 2019-02-18
Attending: FAMILY MEDICINE
Payer: COMMERCIAL

## 2019-02-18 ENCOUNTER — OFFICE VISIT (OUTPATIENT)
Dept: FAMILY MEDICINE CLINIC | Facility: CLINIC | Age: 59
End: 2019-02-18
Payer: COMMERCIAL

## 2019-02-18 VITALS
BODY MASS INDEX: 34.01 KG/M2 | SYSTOLIC BLOOD PRESSURE: 136 MMHG | RESPIRATION RATE: 18 BRPM | WEIGHT: 227 LBS | HEART RATE: 94 BPM | TEMPERATURE: 98 F | HEIGHT: 68.5 IN | DIASTOLIC BLOOD PRESSURE: 82 MMHG

## 2019-02-18 DIAGNOSIS — E11.9 CONTROLLED TYPE 2 DIABETES MELLITUS WITHOUT COMPLICATION, WITHOUT LONG-TERM CURRENT USE OF INSULIN (HCC): ICD-10-CM

## 2019-02-18 DIAGNOSIS — I10 BENIGN ESSENTIAL HYPERTENSION: ICD-10-CM

## 2019-02-18 DIAGNOSIS — E11.9 CONTROLLED TYPE 2 DIABETES MELLITUS WITHOUT COMPLICATION, WITHOUT LONG-TERM CURRENT USE OF INSULIN (HCC): Primary | ICD-10-CM

## 2019-02-18 LAB
ALBUMIN SERPL-MCNC: 3.9 G/DL (ref 3.4–5)
ALBUMIN/GLOB SERPL: 1.2 {RATIO} (ref 1–2)
ALP LIVER SERPL-CCNC: 52 U/L (ref 45–117)
ALT SERPL-CCNC: 51 U/L (ref 16–61)
ANION GAP SERPL CALC-SCNC: 9 MMOL/L (ref 0–18)
AST SERPL-CCNC: 47 U/L (ref 15–37)
BILIRUB SERPL-MCNC: 0.5 MG/DL (ref 0.1–2)
BUN BLD-MCNC: 16 MG/DL (ref 7–18)
BUN/CREAT SERPL: 16.5 (ref 10–20)
CALCIUM BLD-MCNC: 9.1 MG/DL (ref 8.5–10.1)
CHLORIDE SERPL-SCNC: 105 MMOL/L (ref 98–107)
CO2 SERPL-SCNC: 25 MMOL/L (ref 21–32)
CREAT BLD-MCNC: 0.97 MG/DL (ref 0.7–1.3)
EST. AVERAGE GLUCOSE BLD GHB EST-MCNC: 140 MG/DL (ref 68–126)
GLOBULIN PLAS-MCNC: 3.2 G/DL (ref 2.8–4.4)
GLUCOSE BLD-MCNC: 119 MG/DL (ref 70–99)
HBA1C MFR BLD HPLC: 6.5 % (ref ?–5.7)
M PROTEIN MFR SERPL ELPH: 7.1 G/DL (ref 6.4–8.2)
OSMOLALITY SERPL CALC.SUM OF ELEC: 290 MOSM/KG (ref 275–295)
POTASSIUM SERPL-SCNC: 3.8 MMOL/L (ref 3.5–5.1)
SODIUM SERPL-SCNC: 139 MMOL/L (ref 136–145)

## 2019-02-18 PROCEDURE — 80053 COMPREHEN METABOLIC PANEL: CPT | Performed by: FAMILY MEDICINE

## 2019-02-18 PROCEDURE — 99213 OFFICE O/P EST LOW 20 MIN: CPT | Performed by: FAMILY MEDICINE

## 2019-02-18 PROCEDURE — 83036 HEMOGLOBIN GLYCOSYLATED A1C: CPT | Performed by: FAMILY MEDICINE

## 2019-02-18 PROCEDURE — 36415 COLL VENOUS BLD VENIPUNCTURE: CPT | Performed by: FAMILY MEDICINE

## 2019-02-18 RX ORDER — TADALAFIL 20 MG/1
20 TABLET ORAL
Qty: 12 TABLET | Refills: 11 | Status: SHIPPED | OUTPATIENT
Start: 2019-02-18 | End: 2019-09-20

## 2019-02-18 NOTE — PROGRESS NOTES
[address]  PROGRESS NOTE  Chief Complaint:   Patient presents with: Follow - Up  Diabetes      HPI:   This is a 60year old male coming in for follow-up on his diabetes and his blood pressure.     He said that at home his blood pressure Triglycerides 195 (H) 30 - 149 mg/dL    LDL Cholesterol 102 (H) <100 mg/dL    VLDL 39 (H) 0 - 30 mg/dL    Non HDL Chol 141 (H) <130 mg/dL   MICROALB/CREAT RATIO, RANDOM URINE   Result Value Ref Range    Microalbumin, Urine 2.47 mg/dL    Creatinine Ur Rando Drug use: No    Family History:  Family History   Problem Relation Age of Onset   • Hypertension Father    • Hypertension Mother      Allergies:    No Known Allergies        Current Meds:    Current Outpatient Medications:   Tadalafil (CIALIS) 20 MG Oral Ta hearing loss, sneezing, congestion, runny nose or sore throat. INTEGUMENTARY:  Denies rashes, itching, skin lesion, or excessive skin dryness.   CARDIOVASCULAR:  Denies chest pain, chest pressure, chest discomfort, palpitations, edema, dyspnea on exertion Regular rate and rhythm, no murmurs, rubs or gallops. LUNGS: Clear to auscultation bilterally, no rales/rhonchi/wheezing. ABDOMEN:  Soft, nondistended, nontender, bowel sounds normal in all 4 quadrants, no masses, no hepatosplenomegaly.   EXTREMITIES:  No allergies, or worsening or changing symptoms. Patient is to call with any side effects or complications from the treatments as a result of today.      Problem List:  Patient Active Problem List:     Actinic keratosis     Diabetes mellitus type II, controll

## 2019-02-19 ENCOUNTER — TELEPHONE (OUTPATIENT)
Dept: FAMILY MEDICINE CLINIC | Facility: CLINIC | Age: 59
End: 2019-02-19

## 2019-02-19 NOTE — TELEPHONE ENCOUNTER
----- Message from Imani Henry MD sent at 2/18/2019  5:12 PM CST -----  Please call Mendel Rand. His hemoglobin A1c was 6.5. This is unchanged compared to last year and is completely normal.  His kidney function is normal.  No changes recommended now.

## 2019-04-15 ENCOUNTER — MED REC SCAN ONLY (OUTPATIENT)
Dept: FAMILY MEDICINE CLINIC | Facility: CLINIC | Age: 59
End: 2019-04-15

## 2019-04-17 ENCOUNTER — OFFICE VISIT (OUTPATIENT)
Dept: FAMILY MEDICINE CLINIC | Facility: CLINIC | Age: 59
End: 2019-04-17
Payer: COMMERCIAL

## 2019-04-17 VITALS
WEIGHT: 274.19 LBS | HEART RATE: 84 BPM | DIASTOLIC BLOOD PRESSURE: 82 MMHG | SYSTOLIC BLOOD PRESSURE: 114 MMHG | RESPIRATION RATE: 20 BRPM | HEIGHT: 68.5 IN | BODY MASS INDEX: 41.08 KG/M2 | TEMPERATURE: 97 F

## 2019-04-17 DIAGNOSIS — G47.33 OBSTRUCTIVE SLEEP APNEA: Primary | ICD-10-CM

## 2019-04-17 PROCEDURE — 99213 OFFICE O/P EST LOW 20 MIN: CPT | Performed by: NURSE PRACTITIONER

## 2019-04-17 NOTE — PROGRESS NOTES
[facility] [place]  SLE[facility]GR[facility] [place]        HPI:   This is a 60year old male coming in for Patient presents with:  Obstructive Sleep Apnea (THEODORA): Sleep compliance f/u      HPI:     Present to review sleep therapy.  States that he is doing wel Medications: Tadalafil (CIALIS) 20 MG Oral Tab Take 1 tablet (20 mg total) by mouth daily as needed for Erectile Dysfunction. Take as directed Disp: 12 tablet Rfl: 11   Glucose Blood (CONTOUR NEXT TEST) In Vitro Strip Contour Next Glucose Test Strips.  3x of knee     Obstructive sleep apnea     Calculus of ureter     S/P vasectomy     Osteoarthritis      REVIEW OF SYSTEMS:   Review of Systems   Constitutional: Negative. HENT: Negative. Eyes: Negative. Respiratory: Negative.     Cardiovascular: Negat Judgment and thought content normal.   Nursing note and vitals reviewed. ASSESSMENT AND PLAN:   1. Obstructive sleep apnea    Patient Instructions   Continue sleep therapy. Follow-up in 6 months - sooner if needed.      Advised if still with sleep ap is to call with any side effects or complications from the treatments as a result of today.        [address] Lares, ZACARIAS  4/17/2019  9:00 AM

## 2019-04-24 ENCOUNTER — TELEPHONE (OUTPATIENT)
Dept: FAMILY MEDICINE CLINIC | Facility: CLINIC | Age: 59
End: 2019-04-24

## 2019-04-24 NOTE — TELEPHONE ENCOUNTER
Patient informed PriorAuthorization is in process. Informed will let [facility] know when complete/agreed.

## 2019-04-26 ENCOUNTER — TELEPHONE (OUTPATIENT)
Dept: FAMILY MEDICINE CLINIC | Facility: CLINIC | Age: 59
End: 2019-04-26

## 2019-04-26 DIAGNOSIS — E29.1 MALE HYPOGONADISM: Primary | ICD-10-CM

## 2019-04-26 NOTE — TELEPHONE ENCOUNTER
Patient informed Testosterone Level required for Testosterone Gel Prior Auth. Appt given. Future appt:     Your appointments     Date & Time Appointment Department [facility])    Apr 30, 2019  8:45 AM CDT Nurse Only with [facility] [id number] [address]

## 2019-04-26 NOTE — TELEPHONE ENCOUNTER
Prior Authorization denied. Needing new Testosterone Level done. Please advise Order.   fOelia Green, 04/26/19, 9:30 AM

## 2019-04-30 ENCOUNTER — NURSE ONLY (OUTPATIENT)
Dept: FAMILY MEDICINE CLINIC | Facility: CLINIC | Age: 59
End: 2019-04-30
Payer: COMMERCIAL

## 2019-04-30 ENCOUNTER — APPOINTMENT (OUTPATIENT)
Dept: LAB | Age: 59
End: 2019-04-30
Attending: FAMILY MEDICINE
Payer: COMMERCIAL

## 2019-04-30 DIAGNOSIS — E29.1 MALE HYPOGONADISM: ICD-10-CM

## 2019-04-30 PROCEDURE — 36415 COLL VENOUS BLD VENIPUNCTURE: CPT | Performed by: FAMILY MEDICINE

## 2019-04-30 PROCEDURE — 84403 ASSAY OF TOTAL TESTOSTERONE: CPT | Performed by: FAMILY MEDICINE

## 2019-04-30 RX ORDER — CANDESARTAN CILEXETIL AND HYDROCHLOROTHIAZIDE 32; 12.5 MG/1; MG/1
TABLET ORAL
Qty: 30 TABLET | Refills: 1 | Status: SHIPPED | OUTPATIENT
Start: 2019-04-30 | End: 2019-05-21

## 2019-04-30 RX ORDER — AMLODIPINE BESYLATE 10 MG/1
TABLET ORAL
Qty: 90 TABLET | Refills: 1 | Status: SHIPPED | OUTPATIENT
Start: 2019-04-30 | End: 2020-02-17

## 2019-04-30 RX ORDER — SIMVASTATIN 40 MG
TABLET ORAL
Qty: 90 TABLET | Refills: 1 | Status: SHIPPED | OUTPATIENT
Start: 2019-04-30 | End: 2019-05-21

## 2019-04-30 NOTE — TELEPHONE ENCOUNTER
Future appt: Your appointments     Date & Time Appointment Department [facility])    Apr 30, 2019  9:15 AM CDT Laboratory Visit with REF Star[facility] ([facility] [place])            [facility]  [facility] [place]  [address]

## 2019-05-01 ENCOUNTER — TELEPHONE (OUTPATIENT)
Dept: FAMILY MEDICINE CLINIC | Facility: CLINIC | Age: 59
End: 2019-05-01

## 2019-05-01 NOTE — PATIENT INSTRUCTIONS
Patient had blood draw with [facility]. Paperwork and charges through that draw.   Rian Dang, 05/01/19, 9:42 AM

## 2019-05-01 NOTE — TELEPHONE ENCOUNTER
----- Message from Anna Wilcox MD sent at 5/1/2019  1:39 PM CDT -----  Please call Biju Cerda. His testosterone level is 119. This is within the a range that we are looking for with men who are receiving treatment.   We will send this to the insurance c

## 2019-05-21 RX ORDER — SIMVASTATIN 40 MG
40 TABLET ORAL NIGHTLY
Qty: 90 TABLET | Refills: 1 | Status: SHIPPED | OUTPATIENT
Start: 2019-05-21 | End: 2020-01-21

## 2019-05-21 RX ORDER — CANDESARTAN CILEXETIL AND HYDROCHLOROTHIAZIDE 32; 12.5 MG/1; MG/1
TABLET ORAL
Qty: 90 TABLET | Refills: 1 | Status: SHIPPED | OUTPATIENT
Start: 2019-05-21 | End: 2019-08-20

## 2019-05-21 RX ORDER — AMLODIPINE BESYLATE 10 MG/1
TABLET ORAL
Qty: 90 TABLET | Refills: 1 | Status: SHIPPED | OUTPATIENT
Start: 2019-05-21 | End: 2019-07-09

## 2019-05-21 NOTE — TELEPHONE ENCOUNTER
Future appt:    Last Appointment:  2/18/2019  Cholesterol, Total (mg/dL)   Date Value   08/10/2018 196     HDL Cholesterol (mg/dL)   Date Value   08/10/2018 55     LDL Cholesterol (mg/dL)   Date Value   08/10/2018 102 (H)     Triglycerides (mg/dL)   Date V

## 2019-07-02 RX ORDER — TAMSULOSIN HYDROCHLORIDE 0.4 MG/1
CAPSULE ORAL
Qty: 90 CAPSULE | Refills: 1 | Status: SHIPPED | OUTPATIENT
Start: 2019-07-02 | End: 2020-02-17

## 2019-07-02 RX ORDER — METOPROLOL SUCCINATE 50 MG/1
TABLET, EXTENDED RELEASE ORAL
Qty: 90 TABLET | Refills: 1 | Status: SHIPPED | OUTPATIENT
Start: 2019-07-02 | End: 2019-07-09

## 2019-07-09 ENCOUNTER — OFFICE VISIT (OUTPATIENT)
Dept: FAMILY MEDICINE CLINIC | Facility: CLINIC | Age: 59
End: 2019-07-09
Payer: COMMERCIAL

## 2019-07-09 ENCOUNTER — TELEPHONE (OUTPATIENT)
Dept: FAMILY MEDICINE CLINIC | Facility: CLINIC | Age: 59
End: 2019-07-09

## 2019-07-09 VITALS
TEMPERATURE: 99 F | RESPIRATION RATE: 16 BRPM | OXYGEN SATURATION: 97 % | HEART RATE: 84 BPM | DIASTOLIC BLOOD PRESSURE: 88 MMHG | SYSTOLIC BLOOD PRESSURE: 138 MMHG | BODY MASS INDEX: 40.9 KG/M2 | WEIGHT: 273 LBS | HEIGHT: 68.5 IN

## 2019-07-09 DIAGNOSIS — H10.32 ACUTE BACTERIAL CONJUNCTIVITIS OF LEFT EYE: Primary | ICD-10-CM

## 2019-07-09 DIAGNOSIS — E78.49 FAMILIAL MULTIPLE LIPOPROTEIN-TYPE HYPERLIPIDEMIA: ICD-10-CM

## 2019-07-09 DIAGNOSIS — E11.9 CONTROLLED TYPE 2 DIABETES MELLITUS WITHOUT COMPLICATION, WITHOUT LONG-TERM CURRENT USE OF INSULIN (HCC): Primary | ICD-10-CM

## 2019-07-09 PROCEDURE — 99213 OFFICE O/P EST LOW 20 MIN: CPT | Performed by: FAMILY MEDICINE

## 2019-07-09 RX ORDER — TOBRAMYCIN 3 MG/ML
2 SOLUTION/ DROPS OPHTHALMIC EVERY 4 HOURS
Qty: 5 ML | Refills: 0 | Status: SHIPPED | OUTPATIENT
Start: 2019-07-09 | End: 2019-07-18 | Stop reason: ALTCHOICE

## 2019-07-09 NOTE — PATIENT INSTRUCTIONS
Please remove and discard this pair of contacts. Use eye drops four times a day until eye is improved.

## 2019-07-09 NOTE — PROGRESS NOTES
[facility] [place]  PROGRESS NOTE  Chief Complaint:   Patient presents with:  Eye Problem      HPI:   This is a 60year old male coming in for trouble with his left eye. He said that his left eye has been red and irritated for several weeks.   H DAY Disp: 90 tablet Rfl: 1   simvastatin 40 MG Oral Tab Take 1 tablet (40 mg total) by mouth nightly.  Disp: 90 tablet Rfl: 1   AMLODIPINE BESYLATE 10 MG Oral Tab TAKE ONE TABLET BY MOUTH EVERY DAY Disp: 90 tablet Rfl: 1   Tadalafil (CIALIS) 20 MG Oral Tab Ht 68.5\"   Wt 273 lb   SpO2 97%   BMI 40.91 kg/m²  Estimated body mass index is 40.91 kg/m² as calculated from the following:    Height as of this encounter: 68.5\". Weight as of this encounter: 273 lb. Vital signs reviewed.   Appears stated age, we Patient is to call with any side effects or complications from the treatments as a result of today.      Problem List:  Patient Active Problem List:     Actinic keratosis     Diabetes mellitus type II, controlled ([address]     Laxity of ligament     Encounter f

## 2019-07-18 ENCOUNTER — OFFICE VISIT (OUTPATIENT)
Dept: FAMILY MEDICINE CLINIC | Facility: CLINIC | Age: 59
End: 2019-07-18
Payer: COMMERCIAL

## 2019-07-18 ENCOUNTER — HOSPITAL ENCOUNTER (OUTPATIENT)
Dept: GENERAL RADIOLOGY | Age: 59
Discharge: HOME | End: 2019-07-18
Attending: NURSE PRACTITIONER
Payer: COMMERCIAL

## 2019-07-18 VITALS
HEIGHT: 68.5 IN | OXYGEN SATURATION: 97 % | DIASTOLIC BLOOD PRESSURE: 86 MMHG | RESPIRATION RATE: 16 BRPM | WEIGHT: 277.38 LBS | BODY MASS INDEX: 41.56 KG/M2 | HEART RATE: 77 BPM | SYSTOLIC BLOOD PRESSURE: 138 MMHG | TEMPERATURE: 98 F

## 2019-07-18 DIAGNOSIS — M54.42 ACUTE LEFT-SIDED LOW BACK PAIN WITH LEFT-SIDED SCIATICA: Primary | ICD-10-CM

## 2019-07-18 DIAGNOSIS — M54.42 ACUTE LEFT-SIDED LOW BACK PAIN WITH LEFT-SIDED SCIATICA: ICD-10-CM

## 2019-07-18 PROCEDURE — 72110 X-RAY EXAM L-2 SPINE 4/>VWS: CPT | Performed by: NURSE PRACTITIONER

## 2019-07-18 PROCEDURE — 72220 X-RAY EXAM SACRUM TAILBONE: CPT | Performed by: NURSE PRACTITIONER

## 2019-07-18 PROCEDURE — 99214 OFFICE O/P EST MOD 30 MIN: CPT | Performed by: NURSE PRACTITIONER

## 2019-07-18 RX ORDER — METHYLPREDNISOLONE 4 MG/1
TABLET ORAL
Qty: 1 KIT | Refills: 0 | Status: SHIPPED | OUTPATIENT
Start: 2019-07-18 | End: 2019-08-20 | Stop reason: ALTCHOICE

## 2019-07-18 NOTE — PROGRESS NOTES
CHIEF COMPLAINT:     Patient presents with:  Low Back Pain: with pain going down left leg with numbness at times      HPI:   Snow Trejo is a 60year old male who is here for complaints of Left low back pain with radiation.     Pain is located at left l MG Oral Capsule Delayed Release TAKE ONE CAPSULE BY MOUTH EVERY DAY IN THE MORNING BEFORE BREAKFAST Disp: 90 capsule Rfl: 3   METOPROLOL SUCCINATE ER 50 MG Oral Tablet 24 Hr TAKE ONE TABLET BY MOUTH EVERY DAY Disp: 90 tablet Rfl: 3   Glucose Blood (CARLOZ C rashes,no suspicious lesions  NECK: supple,no adenopathy,no bruits  LUNGS: clear to auscultation  CARDIO: RRR without murmur  GI: normoactive bs x4, no masses, HSM or tenderness  EXTREMITIES: no cyanosis, clubbing or edema  BACK: lumbosacral tenderness, le

## 2019-08-05 ENCOUNTER — TELEPHONE (OUTPATIENT)
Dept: FAMILY MEDICINE CLINIC | Facility: CLINIC | Age: 59
End: 2019-08-05

## 2019-08-05 DIAGNOSIS — M54.42 ACUTE BACK PAIN WITH SCIATICA, LEFT: Primary | ICD-10-CM

## 2019-08-15 ENCOUNTER — LABORATORY ENCOUNTER (OUTPATIENT)
Dept: LAB | Age: 59
End: 2019-08-15
Attending: FAMILY MEDICINE
Payer: COMMERCIAL

## 2019-08-15 DIAGNOSIS — E78.49 FAMILIAL MULTIPLE LIPOPROTEIN-TYPE HYPERLIPIDEMIA: ICD-10-CM

## 2019-08-15 DIAGNOSIS — E11.9 CONTROLLED TYPE 2 DIABETES MELLITUS WITHOUT COMPLICATION, WITHOUT LONG-TERM CURRENT USE OF INSULIN (HCC): ICD-10-CM

## 2019-08-15 LAB
ALBUMIN SERPL-MCNC: 3.8 G/DL (ref 3.4–5)
ALBUMIN/GLOB SERPL: 1.1 {RATIO} (ref 1–2)
ALP LIVER SERPL-CCNC: 51 U/L (ref 45–117)
ALT SERPL-CCNC: 43 U/L (ref 16–61)
ANION GAP SERPL CALC-SCNC: 6 MMOL/L (ref 0–18)
AST SERPL-CCNC: 31 U/L (ref 15–37)
BASOPHILS # BLD AUTO: 0.02 X10(3) UL (ref 0–0.2)
BASOPHILS NFR BLD AUTO: 0.5 %
BILIRUB SERPL-MCNC: 0.6 MG/DL (ref 0.1–2)
BUN BLD-MCNC: 17 MG/DL (ref 7–18)
BUN/CREAT SERPL: 14.9 (ref 10–20)
CALCIUM BLD-MCNC: 9.4 MG/DL (ref 8.5–10.1)
CHLORIDE SERPL-SCNC: 107 MMOL/L (ref 98–112)
CHOLEST SMN-MCNC: 179 MG/DL (ref ?–200)
CO2 SERPL-SCNC: 28 MMOL/L (ref 21–32)
COMPLEXED PSA SERPL-MCNC: 0.78 NG/ML (ref ?–4)
CREAT BLD-MCNC: 1.14 MG/DL (ref 0.7–1.3)
CREAT UR-SCNC: 247 MG/DL
DEPRECATED RDW RBC AUTO: 46.5 FL (ref 35.1–46.3)
EOSINOPHIL # BLD AUTO: 0.21 X10(3) UL (ref 0–0.7)
EOSINOPHIL NFR BLD AUTO: 5.3 %
ERYTHROCYTE [DISTWIDTH] IN BLOOD BY AUTOMATED COUNT: 12.3 % (ref 11–15)
EST. AVERAGE GLUCOSE BLD GHB EST-MCNC: 154 MG/DL (ref 68–126)
GLOBULIN PLAS-MCNC: 3.5 G/DL (ref 2.8–4.4)
GLUCOSE BLD-MCNC: 133 MG/DL (ref 70–99)
HBA1C MFR BLD HPLC: 7 % (ref ?–5.7)
HCT VFR BLD AUTO: 44.9 % (ref 39–53)
HDLC SERPL-MCNC: 54 MG/DL (ref 40–59)
HGB BLD-MCNC: 15.2 G/DL (ref 13–17.5)
IMM GRANULOCYTES # BLD AUTO: 0.01 X10(3) UL (ref 0–1)
IMM GRANULOCYTES NFR BLD: 0.3 %
LDLC SERPL CALC-MCNC: 99 MG/DL (ref ?–100)
LYMPHOCYTES # BLD AUTO: 1.18 X10(3) UL (ref 1–4)
LYMPHOCYTES NFR BLD AUTO: 29.7 %
M PROTEIN MFR SERPL ELPH: 7.3 G/DL (ref 6.4–8.2)
MCH RBC QN AUTO: 34.3 PG (ref 26–34)
MCHC RBC AUTO-ENTMCNC: 33.9 G/DL (ref 31–37)
MCV RBC AUTO: 101.4 FL (ref 80–100)
MICROALBUMIN UR-MCNC: 2.55 MG/DL
MICROALBUMIN/CREAT 24H UR-RTO: 10.3 UG/MG (ref ?–30)
MONOCYTES # BLD AUTO: 0.46 X10(3) UL (ref 0.1–1)
MONOCYTES NFR BLD AUTO: 11.6 %
NEUTROPHILS # BLD AUTO: 2.09 X10 (3) UL (ref 1.5–7.7)
NEUTROPHILS # BLD AUTO: 2.09 X10(3) UL (ref 1.5–7.7)
NEUTROPHILS NFR BLD AUTO: 52.6 %
NONHDLC SERPL-MCNC: 125 MG/DL (ref ?–130)
OSMOLALITY SERPL CALC.SUM OF ELEC: 295 MOSM/KG (ref 275–295)
PLATELET # BLD AUTO: 190 10(3)UL (ref 150–450)
POTASSIUM SERPL-SCNC: 4.2 MMOL/L (ref 3.5–5.1)
RBC # BLD AUTO: 4.43 X10(6)UL (ref 4.3–5.7)
SODIUM SERPL-SCNC: 141 MMOL/L (ref 136–145)
TRIGL SERPL-MCNC: 132 MG/DL (ref 30–149)
TSI SER-ACNC: 1.42 MIU/ML (ref 0.36–3.74)
URATE SERPL-MCNC: 7.8 MG/DL (ref 3.5–7.2)
VLDLC SERPL CALC-MCNC: 26 MG/DL (ref 0–30)
WBC # BLD AUTO: 4 X10(3) UL (ref 4–11)

## 2019-08-15 PROCEDURE — 82570 ASSAY OF URINE CREATININE: CPT | Performed by: FAMILY MEDICINE

## 2019-08-15 PROCEDURE — 80061 LIPID PANEL: CPT | Performed by: FAMILY MEDICINE

## 2019-08-15 PROCEDURE — 36415 COLL VENOUS BLD VENIPUNCTURE: CPT | Performed by: FAMILY MEDICINE

## 2019-08-15 PROCEDURE — 82043 UR ALBUMIN QUANTITATIVE: CPT | Performed by: FAMILY MEDICINE

## 2019-08-15 PROCEDURE — 84153 ASSAY OF PSA TOTAL: CPT | Performed by: FAMILY MEDICINE

## 2019-08-15 PROCEDURE — 83036 HEMOGLOBIN GLYCOSYLATED A1C: CPT | Performed by: FAMILY MEDICINE

## 2019-08-15 PROCEDURE — 84550 ASSAY OF BLOOD/URIC ACID: CPT | Performed by: FAMILY MEDICINE

## 2019-08-15 PROCEDURE — 80050 GENERAL HEALTH PANEL: CPT | Performed by: FAMILY MEDICINE

## 2019-08-20 ENCOUNTER — OFFICE VISIT (OUTPATIENT)
Dept: FAMILY MEDICINE CLINIC | Facility: CLINIC | Age: 59
End: 2019-08-20
Payer: COMMERCIAL

## 2019-08-20 VITALS
HEIGHT: 70 IN | RESPIRATION RATE: 18 BRPM | TEMPERATURE: 98 F | WEIGHT: 265.25 LBS | HEART RATE: 88 BPM | DIASTOLIC BLOOD PRESSURE: 80 MMHG | SYSTOLIC BLOOD PRESSURE: 140 MMHG | BODY MASS INDEX: 37.98 KG/M2

## 2019-08-20 DIAGNOSIS — E78.49 FAMILIAL MULTIPLE LIPOPROTEIN-TYPE HYPERLIPIDEMIA: ICD-10-CM

## 2019-08-20 DIAGNOSIS — E79.0 HYPERURICEMIA: ICD-10-CM

## 2019-08-20 DIAGNOSIS — E29.1 MALE HYPOGONADISM: ICD-10-CM

## 2019-08-20 DIAGNOSIS — K21.9 GASTROESOPHAGEAL REFLUX DISEASE WITHOUT ESOPHAGITIS: ICD-10-CM

## 2019-08-20 DIAGNOSIS — M15.9 PRIMARY OSTEOARTHRITIS INVOLVING MULTIPLE JOINTS: ICD-10-CM

## 2019-08-20 DIAGNOSIS — Z00.01 ENCOUNTER FOR GENERAL ADULT MEDICAL EXAMINATION WITH ABNORMAL FINDINGS: Primary | ICD-10-CM

## 2019-08-20 DIAGNOSIS — I10 BENIGN ESSENTIAL HYPERTENSION: ICD-10-CM

## 2019-08-20 DIAGNOSIS — Z23 NEED FOR VACCINATION: ICD-10-CM

## 2019-08-20 DIAGNOSIS — E11.9 CONTROLLED TYPE 2 DIABETES MELLITUS WITHOUT COMPLICATION, WITHOUT LONG-TERM CURRENT USE OF INSULIN (HCC): ICD-10-CM

## 2019-08-20 DIAGNOSIS — G47.33 OBSTRUCTIVE SLEEP APNEA: ICD-10-CM

## 2019-08-20 PROBLEM — H10.32 ACUTE BACTERIAL CONJUNCTIVITIS OF LEFT EYE: Status: RESOLVED | Noted: 2019-07-09 | Resolved: 2019-08-20

## 2019-08-20 PROCEDURE — 99214 OFFICE O/P EST MOD 30 MIN: CPT | Performed by: FAMILY MEDICINE

## 2019-08-20 PROCEDURE — 99396 PREV VISIT EST AGE 40-64: CPT | Performed by: FAMILY MEDICINE

## 2019-08-20 PROCEDURE — 90732 PPSV23 VACC 2 YRS+ SUBQ/IM: CPT | Performed by: FAMILY MEDICINE

## 2019-08-20 PROCEDURE — 90471 IMMUNIZATION ADMIN: CPT | Performed by: FAMILY MEDICINE

## 2019-08-20 RX ORDER — TESTOSTERONE 12.5 MG/1.25G
50 GEL TOPICAL DAILY
Qty: 90 PACKAGE | Refills: 3 | Status: SHIPPED
Start: 2019-08-20 | End: 2020-02-17

## 2019-08-20 RX ORDER — ALLOPURINOL 300 MG/1
300 TABLET ORAL DAILY
Qty: 90 TABLET | Refills: 1 | Status: SHIPPED | OUTPATIENT
Start: 2019-08-20 | End: 2020-01-21

## 2019-08-20 RX ORDER — LISINOPRIL AND HYDROCHLOROTHIAZIDE 12.5; 1 MG/1; MG/1
1 TABLET ORAL DAILY
Qty: 30 TABLET | Refills: 5 | Status: SHIPPED | OUTPATIENT
Start: 2019-08-20 | End: 2019-11-26

## 2019-08-20 NOTE — PATIENT INSTRUCTIONS
Stop candesartan. Start lisinopril-hydrochlorothiazide. Start allopurinol 300 mg daily. Pneumococcal vaccine today. Referral to Dr. Lakesha Urrutia for colonoscopy in 2020. Recheck in 1 month. Diabetes recheck in 6 months.

## 2019-08-20 NOTE — PROGRESS NOTES
[facility] [place]  PROGRESS NOTE  Chief Complaint:   Patient presents with:  Physical      HPI:   This is a 60year old male coming in for his annual wellness visit. He does have multiple orthopedic type problems.   He has chronic low back pa Result Value Ref Range    Glucose 133 (H) 70 - 99 mg/dL    Sodium 141 136 - 145 mmol/L    Potassium 4.2 3.5 - 5.1 mmol/L    Chloride 107 98 - 112 mmol/L    CO2 28.0 21.0 - 32.0 mmol/L    Anion Gap 6 0 - 18 mmol/L    BUN 17 7 - 18 mg/dL    Creatinine 1.14 Social History:  Social History    Tobacco Use      Smoking status: Never Smoker      Smokeless tobacco: Never Used    Alcohol use:  Yes      Alcohol/week: 10.0 - 12.0 standard drinks      Types: 10 - 12 Shots of liquor per week      Comment: 2 drinks d SYSTEMS:   CONSTITUTIONAL: See HPI  EENT:  Eyes:  Denies eye pain, visual loss, blurred vision, double vision or yellow sclerae. Ears, Nose, Throat:  Denies hearing loss, sneezing, congestion, runny nose or sore throat.   INTEGUMENTARY:  Denies rashes, itch dentition. NECK: Supple, no CLAD, no JVD, no thyromegaly. SKIN: No rashes, no skin lesion, no bruising, good turgor. HEART:  Regular rate and rhythm, no murmurs, rubs or gallops. LUNGS: Clear to auscultation bilterally, no rales/rhonchi/wheezing.   ABDO joints. He is functioning well in spite of it. 9. Need for vaccination  Is due for a pneumococcal vaccine today. - IMMUNIZATION ADMINISTRATION  - PNEUMOCOCCAL IMM (PNEUMOVAX)    10. Hyperuricemia  His uric acid level is 7.8.   Plan: Add allopurinol 30 Calculus of ureter     S/P vasectomy     Osteoarthritis      Rufus Mac MD  8/20/2019  8:40 AM

## 2019-08-21 RX ORDER — METOPROLOL SUCCINATE 50 MG/1
TABLET, EXTENDED RELEASE ORAL
Qty: 90 TABLET | Refills: 1 | Status: SHIPPED | OUTPATIENT
Start: 2019-08-21 | End: 2019-11-29

## 2019-08-21 RX ORDER — TAMSULOSIN HYDROCHLORIDE 0.4 MG/1
CAPSULE ORAL
Qty: 90 CAPSULE | Refills: 1 | Status: SHIPPED | OUTPATIENT
Start: 2019-08-21 | End: 2019-12-13

## 2019-08-21 NOTE — TELEPHONE ENCOUNTER
Future appt:    Last Appointment:  8/20/2019  Cholesterol, Total (mg/dL)   Date Value   08/15/2019 179     HDL Cholesterol (mg/dL)   Date Value   08/15/2019 54     LDL Cholesterol (mg/dL)   Date Value   08/15/2019 99     Triglycerides (mg/dL)   Date Value

## 2019-09-16 ENCOUNTER — TELEPHONE (OUTPATIENT)
Dept: FAMILY MEDICINE CLINIC | Facility: CLINIC | Age: 59
End: 2019-09-16

## 2019-09-16 DIAGNOSIS — G47.33 OSA (OBSTRUCTIVE SLEEP APNEA): Primary | ICD-10-CM

## 2019-09-16 RX ORDER — TADALAFIL 20 MG/1
TABLET ORAL
Qty: 24 TABLET | Refills: 1 | Status: SHIPPED | OUTPATIENT
Start: 2019-09-16 | End: 2019-12-13

## 2019-09-17 NOTE — TELEPHONE ENCOUNTER
Order for new machine done. Please fax to [facility] and let patient know. He should follow up in 31 - 45 days on the new machine - sooner if having problems.

## 2019-09-17 NOTE — TELEPHONE ENCOUNTER
Spoke with patient and he states the latch is broken on the humidifier of his Cpap machine and it then blows open at night making a lot of noise. Pt states he spoke with [facility] and needs an order for new Cpap. Pt states his machine is over 11years old.  IESHA

## 2019-09-20 ENCOUNTER — TELEPHONE (OUTPATIENT)
Dept: FAMILY MEDICINE CLINIC | Facility: CLINIC | Age: 59
End: 2019-09-20

## 2019-09-20 RX ORDER — TADALAFIL 20 MG/1
20 TABLET ORAL
Qty: 12 TABLET | Refills: 11 | COMMUNITY
Start: 2019-09-20 | End: 2020-03-16

## 2019-09-20 NOTE — TELEPHONE ENCOUNTER
Henrique Banks at [employer] audited them for patient's 2017 Cialis. States directions are Cialis 20mg daily/prn    Informed Cialis 20mg 1 tab daily/prn. Max dose 1 tab daily. Directions updated in sytem.   Henrique Banks will up

## 2019-10-10 ENCOUNTER — TELEPHONE (OUTPATIENT)
Dept: FAMILY MEDICINE CLINIC | Facility: CLINIC | Age: 59
End: 2019-10-10

## 2019-10-10 NOTE — TELEPHONE ENCOUNTER
Fax received from [facility] with notification that patient did start CPAP on 9/2719. Pt will need f/u appointment 31-90 days after using machine regularly.

## 2019-11-26 ENCOUNTER — TELEPHONE (OUTPATIENT)
Dept: FAMILY MEDICINE CLINIC | Facility: CLINIC | Age: 59
End: 2019-11-26

## 2019-11-26 RX ORDER — FELODIPINE 5 MG/1
5 TABLET, EXTENDED RELEASE ORAL DAILY
Qty: 30 TABLET | Refills: 5 | Status: SHIPPED | OUTPATIENT
Start: 2019-11-26 | End: 2019-11-29

## 2019-11-26 NOTE — TELEPHONE ENCOUNTER
on a new med for HTN        c/o cough    was told to let Dr Sera Castelan if this occured.       Please advise

## 2019-11-26 NOTE — TELEPHONE ENCOUNTER
Please stop taking Lisinopril. It is now listed as an allergy. Start taking Felodipine 5 mg po daily. Make an appointment for 4-6 weeks to recheck blood pressure.

## 2019-11-26 NOTE — TELEPHONE ENCOUNTER
Coughing after breakfast for a few weeks. Coughing so hard he is vomiting and gagging. Denies feeling ill. Afebrile. Denies nasal drainage or sore throat.   He does not cough the rest of the day, only after breakfast. He has been taking lisinopril since Aug

## 2019-11-29 ENCOUNTER — TELEPHONE (OUTPATIENT)
Dept: FAMILY MEDICINE CLINIC | Facility: CLINIC | Age: 59
End: 2019-11-29

## 2019-11-29 RX ORDER — METOPROLOL SUCCINATE 100 MG/1
100 TABLET, EXTENDED RELEASE ORAL DAILY
Qty: 90 TABLET | Refills: 0 | Status: SHIPPED | OUTPATIENT
Start: 2019-11-29 | End: 2019-12-03

## 2019-11-29 RX ORDER — HYDROCHLOROTHIAZIDE 12.5 MG/1
12.5 TABLET ORAL DAILY
Qty: 90 TABLET | Refills: 0 | Status: SHIPPED | OUTPATIENT
Start: 2019-11-29 | End: 2019-12-03

## 2019-11-29 NOTE — TELEPHONE ENCOUNTER
Stop felodipine - continue amlodipine     Increase metoprolol to 100 mg     Restart HCTZ 12.5 mg daily    Follow up for blood pressure check in 2 weeks.      Monitor  Pulse - should be 60 or more

## 2019-11-29 NOTE — TELEPHONE ENCOUNTER
pharmacy will not dispense Felodipine ER 5 MG- pt was told he can not take this while on amlodipine or he may end up in the hospital- pt wants to clarify this with nurse

## 2019-11-29 NOTE — TELEPHONE ENCOUNTER
Confirmed with Rachel that  did start patient on the Felodipine along with  Taking Amlodipine. They will fill the Felodpine. Pharmacist concerned of patient being on   2 Calcium Blockers and No  HCTZ.     Pharmacist states he will advise pancho

## 2019-12-02 ENCOUNTER — TELEPHONE (OUTPATIENT)
Dept: FAMILY MEDICINE CLINIC | Facility: CLINIC | Age: 59
End: 2019-12-02

## 2019-12-02 NOTE — TELEPHONE ENCOUNTER
Patient states that he is sure that his Lisinopril medication is not causing his cough, states that it's just him being sick given the fact that everyone in his family is sick as well. Patient states that he will need a refill on his Lisinopril medication.

## 2019-12-03 RX ORDER — LISINOPRIL AND HYDROCHLOROTHIAZIDE 12.5; 1 MG/1; MG/1
1 TABLET ORAL DAILY
COMMUNITY
End: 2019-12-08

## 2019-12-03 NOTE — TELEPHONE ENCOUNTER
Patient states he is returning to the  [address] States he had a horrible cold and that is what his cough was from. Is not going to take the increased Metoprolol/HCTZ.     Lisinopril/HCTZ reentered to medication list.  Taya Alvarado, 12/03/19, 8:58

## 2019-12-08 RX ORDER — LISINOPRIL AND HYDROCHLOROTHIAZIDE 12.5; 1 MG/1; MG/1
1 TABLET ORAL DAILY
Qty: 90 TABLET | Refills: 1 | Status: SHIPPED | OUTPATIENT
Start: 2019-12-08 | End: 2020-05-11

## 2019-12-13 ENCOUNTER — OFFICE VISIT (OUTPATIENT)
Dept: FAMILY MEDICINE CLINIC | Facility: CLINIC | Age: 59
End: 2019-12-13
Payer: COMMERCIAL

## 2019-12-13 VITALS
TEMPERATURE: 98 F | BODY MASS INDEX: 36 KG/M2 | SYSTOLIC BLOOD PRESSURE: 110 MMHG | WEIGHT: 250 LBS | RESPIRATION RATE: 20 BRPM | HEART RATE: 85 BPM | DIASTOLIC BLOOD PRESSURE: 80 MMHG | OXYGEN SATURATION: 96 %

## 2019-12-13 DIAGNOSIS — R09.81 NASAL CONGESTION: ICD-10-CM

## 2019-12-13 DIAGNOSIS — R05.9 COUGH: Primary | ICD-10-CM

## 2019-12-13 PROCEDURE — 99214 OFFICE O/P EST MOD 30 MIN: CPT | Performed by: FAMILY MEDICINE

## 2019-12-13 RX ORDER — FLUTICASONE PROPIONATE 50 MCG
1 SPRAY, SUSPENSION (ML) NASAL 2 TIMES DAILY
Qty: 16 G | Refills: 5 | Status: SHIPPED | OUTPATIENT
Start: 2019-12-13 | End: 2020-09-05

## 2019-12-13 RX ORDER — POTASSIUM CITRATE 5 MEQ/1
2 TABLET, EXTENDED RELEASE ORAL DAILY
Refills: 1 | COMMUNITY
Start: 2019-12-02 | End: 2021-09-01

## 2019-12-13 NOTE — PROGRESS NOTES
[address]  PROGRESS NOTE  Chief Complaint:   Patient presents with:  Cough: Persistant cough that has been going on since the begining of November.   Taking Muccinex D.      HPI:   This is a 60year old male coming in for follow-up on hi Creatinine 1.14 0.70 - 1.30 mg/dL    BUN/CREA Ratio 14.9 10.0 - 20.0    Calcium, Total 9.4 8.5 - 10.1 mg/dL    Calculated Osmolality 295 275 - 295 mOsm/kg    GFR, Non- 70 >=60    GFR, -American 81 >=60    AST 31 15 - 37 U/L    ALT 43 Comment: 2 drinks daily    Drug use: No    Family History:  Family History   Problem Relation Age of Onset   • Hypertension Father    • Hypertension Mother      Allergies:    No Known Allergies        Current Meds:  Current Outpatient Medications   Medicat chills. EENT:  Eyes:  Denies eye pain, visual loss, blurred vision, double vision or yellow sclerae. Ears, Nose, Throat:  Denies hearing loss, sneezing, congestion, runny nose or sore throat.   INTEGUMENTARY:  Denies rashes, itching, skin lesion, or excess CLAD, no JVD, no thyromegaly. SKIN: No rashes, no skin lesion, no bruising, good turgor. HEART:  Regular rate and rhythm, no murmurs, rubs or gallops. LUNGS: Clear to auscultation. Good air entry bilaterally. No wheezes noted. No crackles noted.   ABD dysfunction with inhibited sexual excitement     Esophageal reflux     Familial multiple lipoprotein-type hyperlipidemia     Benign essential hypertension     Male hypogonadism     Internal derangement of knee     Obstructive sleep apnea     Calculus of ur

## 2019-12-13 NOTE — PATIENT INSTRUCTIONS
Use Flovent inhaler 1 puff twice a day every day. Use Flonase nose spray 1 squirt each side of the nose twice a day.

## 2019-12-16 ENCOUNTER — OFFICE VISIT (OUTPATIENT)
Dept: FAMILY MEDICINE CLINIC | Facility: CLINIC | Age: 59
End: 2019-12-16
Payer: COMMERCIAL

## 2019-12-16 VITALS
WEIGHT: 247.38 LBS | RESPIRATION RATE: 16 BRPM | DIASTOLIC BLOOD PRESSURE: 70 MMHG | HEART RATE: 88 BPM | TEMPERATURE: 98 F | HEIGHT: 68.25 IN | OXYGEN SATURATION: 97 % | BODY MASS INDEX: 37.49 KG/M2 | SYSTOLIC BLOOD PRESSURE: 110 MMHG

## 2019-12-16 DIAGNOSIS — G47.33 OSA (OBSTRUCTIVE SLEEP APNEA): Primary | ICD-10-CM

## 2019-12-16 PROCEDURE — 99214 OFFICE O/P EST MOD 30 MIN: CPT | Performed by: FAMILY MEDICINE

## 2019-12-16 NOTE — PROGRESS NOTES
[facility] [place]  SLEEP PROGRESS NOTE        HPI:   This is a 60year old male coming in for Patient presents with:  Obstructive Sleep Apnea ([place]A): follow up      HPI:   Gets up once a night to urinate at 3pm.   States he has somewhat of a dry Used    Alcohol use:  Yes      Alcohol/week: 10.0 - 12.0 standard drinks      Types: 10 - 12 Shots of liquor per week      Comment: 2 drinks daily    Drug use: No    Family History:  Family History   Problem Relation Age of Onset   • Hypertension Father (Patient taking differently: 1 lancet by Finger stick route daily. 1x Daily Glucose Testing.   Dx: E11.9  Non Insulin Dependent ) 100 each 3      Counseling given: Not Answered         Problem List:  Patient Active Problem List:     Actinic keratosis     Glenroy Chandler oriented to person, place, and time. He appears well-developed and well-nourished. No distress. HENT:   Head: Normocephalic and atraumatic.    Right Ear: Tympanic membrane, external ear and ear canal normal.   Left Ear: Tympanic membrane, external ear and chamber changed every 6 month  with the [employer] medical equipment provider. Meds & Refills for this Visit:  Requested Prescriptions      No prescriptions requested or ordered in this encounter       Outcome: Parent verbalizes understanding.  Parent

## 2020-01-02 NOTE — TELEPHONE ENCOUNTER
Future appt:     Your appointments     Date & Time Appointment Department [facility])    Jan 07, 2020 10:00 AM CST Presurgical Visit with Maine Ambrocio MD [address], [place] ([facility]

## 2020-01-03 RX ORDER — METOPROLOL SUCCINATE 50 MG/1
TABLET, EXTENDED RELEASE ORAL
Qty: 90 TABLET | Refills: 1 | Status: SHIPPED | OUTPATIENT
Start: 2020-01-03 | End: 2020-06-05

## 2020-01-07 ENCOUNTER — APPOINTMENT (OUTPATIENT)
Dept: LAB | Age: 60
End: 2020-01-07
Attending: FAMILY MEDICINE
Payer: COMMERCIAL

## 2020-01-07 ENCOUNTER — OFFICE VISIT (OUTPATIENT)
Dept: FAMILY MEDICINE CLINIC | Facility: CLINIC | Age: 60
End: 2020-01-07
Payer: COMMERCIAL

## 2020-01-07 VITALS
DIASTOLIC BLOOD PRESSURE: 78 MMHG | RESPIRATION RATE: 20 BRPM | OXYGEN SATURATION: 96 % | HEIGHT: 68.25 IN | BODY MASS INDEX: 37.59 KG/M2 | WEIGHT: 248 LBS | SYSTOLIC BLOOD PRESSURE: 129 MMHG | TEMPERATURE: 98 F | HEART RATE: 89 BPM

## 2020-01-07 DIAGNOSIS — M54.16 RADICULOPATHY, LUMBAR REGION: ICD-10-CM

## 2020-01-07 DIAGNOSIS — E11.9 CONTROLLED TYPE 2 DIABETES MELLITUS WITHOUT COMPLICATION, WITHOUT LONG-TERM CURRENT USE OF INSULIN (HCC): ICD-10-CM

## 2020-01-07 DIAGNOSIS — M48.061 SPINAL STENOSIS OF LUMBAR REGION, UNSPECIFIED WHETHER NEUROGENIC CLAUDICATION PRESENT: ICD-10-CM

## 2020-01-07 DIAGNOSIS — Z01.818 PREOPERATIVE EXAMINATION: Primary | ICD-10-CM

## 2020-01-07 LAB
EST. AVERAGE GLUCOSE BLD GHB EST-MCNC: 137 MG/DL (ref 68–126)
HBA1C MFR BLD HPLC: 6.4 % (ref ?–5.7)

## 2020-01-07 PROCEDURE — 83036 HEMOGLOBIN GLYCOSYLATED A1C: CPT | Performed by: FAMILY MEDICINE

## 2020-01-07 PROCEDURE — 99214 OFFICE O/P EST MOD 30 MIN: CPT | Performed by: FAMILY MEDICINE

## 2020-01-07 PROCEDURE — 36415 COLL VENOUS BLD VENIPUNCTURE: CPT | Performed by: FAMILY MEDICINE

## 2020-01-07 RX ORDER — HYDROCODONE BITARTRATE AND ACETAMINOPHEN 5; 325 MG/1; MG/1
1 TABLET ORAL EVERY 4 HOURS PRN
COMMUNITY
End: 2020-08-18

## 2020-01-07 NOTE — PATIENT INSTRUCTIONS
After today's assessment  patient is at optimum health for surgery and relatively at low risk. There are no contraindication for procedure. Recommend to avoid any use of aleve, aspirin or ibuprofen 1 week before surgery.   Avoid taking metformin on mornin

## 2020-01-07 NOTE — PROGRESS NOTES
[facility]  PRE-[facility]    Chief Complaint:   Patient presents with:  Pre-Op Exam      HPI:   Snow Trejo is a 60year old male with a hx of HTN, DM 2 and spinal stenosis with lumbar radiculopathy, who presents for a pre-operative ph 5 MEQ (540 MG) Oral Tab CR Take 2 tablets by mouth daily. 1   • Fluticasone Propionate HFA (FLOVENT HFA) 220 MCG/ACT Inhalation Aerosol Inhale 1 puff into the lungs 2 (two) times daily.  1 Inhaler 3   • Fluticasone Propionate (FLONASE) 50 MCG/ACT Nasal Clau sneezing, congestion, runny nose or sore throat. INTEGUMENTARY:  Denies rashes, itching, skin lesion, or excessive skin dryness.   CARDIOVASCULAR:  Denies chest pain, chest pressure, chest discomfort, palpitations, edema, dyspnea on exertion or at rest.  R lesion, no bruising, good turgor. HEART:  Regular rate and rhythm, no murmurs, rubs or gallops. LUNGS: Clear to auscultation bilterally, no rales/rhonchi/wheezing. CHEST: No tenderness.   ABDOMEN:  Soft, nondistended, nontender, bowel sounds normal in al keratosis     Diabetes mellitus type II, controlled ([facility] [address]     Laxity of ligament     Encounter for general adult medical examination with abnormal findings     Psychosexual dysfunction with inhibited sexual excitement     Esophageal reflux     Familial mul

## 2020-01-08 ENCOUNTER — TELEPHONE (OUTPATIENT)
Dept: FAMILY MEDICINE CLINIC | Facility: CLINIC | Age: 60
End: 2020-01-08

## 2020-01-08 NOTE — TELEPHONE ENCOUNTER
----- Message from Ai Singleton MD sent at 1/8/2020  8:17 AM CST -----  Please inform patient that his hemoglobin A1c is 6.4. Continue with current medications. Please fax preop H&P and A1c result to surgeon.

## 2020-01-21 RX ORDER — SIMVASTATIN 40 MG
TABLET ORAL
Qty: 90 TABLET | Refills: 1 | Status: SHIPPED | OUTPATIENT
Start: 2020-01-21 | End: 2020-02-17

## 2020-01-21 RX ORDER — ALLOPURINOL 300 MG/1
TABLET ORAL
Qty: 90 TABLET | Refills: 1 | Status: SHIPPED | OUTPATIENT
Start: 2020-01-21 | End: 2020-02-17

## 2020-01-21 NOTE — TELEPHONE ENCOUNTER
Future appt:    Last Appointment with provider:   12/13/2019  Last appointment at [facility]:  1/7/2020  Cholesterol, Total (mg/dL)   Date Value   08/15/2019 179     HDL Cholesterol (mg/dL)   Date Value   08/15/2019 54     LDL Cholesterol (mg/dL)   Date

## 2020-01-22 ENCOUNTER — TELEPHONE (OUTPATIENT)
Dept: FAMILY MEDICINE CLINIC | Facility: CLINIC | Age: 60
End: 2020-01-22

## 2020-01-22 NOTE — TELEPHONE ENCOUNTER
Patient is concerned about his voice, says its very raspy and would like MD or nurse to call him back at [phone]

## 2020-01-22 NOTE — TELEPHONE ENCOUNTER
Patient had general anesthesia Friday 1/17  For Spine Surgery. States his voice is raspy and asking what  He can take for it. Advised sugar free cough drops, warm water gargles/agreed.   Susy Daniels, 01/22/20, 11:20 AM

## 2020-02-17 RX ORDER — AMLODIPINE BESYLATE 10 MG/1
TABLET ORAL
Qty: 90 TABLET | Refills: 1 | Status: SHIPPED | OUTPATIENT
Start: 2020-02-17 | End: 2020-03-16

## 2020-02-17 RX ORDER — TESTOSTERONE 12.5 MG/1.25G
GEL TOPICAL
Qty: 150 G | Refills: 3 | Status: SHIPPED | OUTPATIENT
Start: 2020-02-17 | End: 2020-07-06

## 2020-02-17 RX ORDER — OMEPRAZOLE 20 MG/1
CAPSULE, DELAYED RELEASE ORAL
Qty: 90 CAPSULE | Refills: 1 | Status: SHIPPED | OUTPATIENT
Start: 2020-02-17 | End: 2020-03-16

## 2020-02-17 RX ORDER — TAMSULOSIN HYDROCHLORIDE 0.4 MG/1
CAPSULE ORAL
Qty: 90 CAPSULE | Refills: 1 | Status: SHIPPED | OUTPATIENT
Start: 2020-02-17 | End: 2020-08-03

## 2020-02-17 RX ORDER — ALLOPURINOL 300 MG/1
TABLET ORAL
Qty: 90 TABLET | Refills: 1 | Status: SHIPPED | OUTPATIENT
Start: 2020-02-17 | End: 2020-09-05

## 2020-02-17 RX ORDER — SIMVASTATIN 40 MG
TABLET ORAL
Qty: 90 TABLET | Refills: 1 | Status: SHIPPED | OUTPATIENT
Start: 2020-02-17 | End: 2020-08-03

## 2020-02-17 NOTE — TELEPHONE ENCOUNTER
Future appt:    Last Appointment with provider:   12/13/2019    Last appointment [place] [facility]:  1/7/2020  Cholesterol, Total (mg/dL)   Date Value   08/15/2019 179     HDL Cholesterol (mg/dL)   Date Value   08/15/2019 54     LDL Cholesterol (mg/dL)   Andrés

## 2020-03-14 NOTE — TELEPHONE ENCOUNTER
Future appt:    Last Appointment with provider:   12/13/2019  [place] [facility] [place]:  1/7/2020  Cholesterol, Total (mg/dL)   Date Value   08/15/2019 179     HDL Cholesterol (mg/dL)   Date Value   08/15/2019 54     LDL Cholesterol (mg/dL)   Date

## 2020-03-16 RX ORDER — TADALAFIL 20 MG/1
TABLET ORAL
Qty: 24 TABLET | Refills: 1 | Status: SHIPPED | OUTPATIENT
Start: 2020-03-16 | End: 2020-09-05

## 2020-03-16 RX ORDER — AMLODIPINE BESYLATE 10 MG/1
TABLET ORAL
Qty: 90 TABLET | Refills: 1 | Status: SHIPPED | OUTPATIENT
Start: 2020-03-16 | End: 2020-10-06

## 2020-03-16 RX ORDER — OMEPRAZOLE 20 MG/1
CAPSULE, DELAYED RELEASE ORAL
Qty: 90 CAPSULE | Refills: 3 | Status: SHIPPED | OUTPATIENT
Start: 2020-03-16 | End: 2020-10-06

## 2020-05-11 RX ORDER — LISINOPRIL AND HYDROCHLOROTHIAZIDE 12.5; 1 MG/1; MG/1
TABLET ORAL
Qty: 90 TABLET | Refills: 1 | Status: SHIPPED | OUTPATIENT
Start: 2020-05-11 | End: 2020-06-05

## 2020-05-11 NOTE — TELEPHONE ENCOUNTER
Future appt:    Last Appointment with provider:   12/13/2019; No f/u recommended    Last appointment at [facility]:  1/7/2020  Cholesterol, Total (mg/dL)   Date Value   08/15/2019 179     HDL Cholesterol (mg/dL)   Date Value   08/15/2019 54     LDL Brenda

## 2020-06-05 RX ORDER — METOPROLOL SUCCINATE 50 MG/1
TABLET, EXTENDED RELEASE ORAL
Qty: 90 TABLET | Refills: 1 | Status: SHIPPED | OUTPATIENT
Start: 2020-06-05 | End: 2020-11-30

## 2020-06-05 RX ORDER — LISINOPRIL AND HYDROCHLOROTHIAZIDE 12.5; 1 MG/1; MG/1
TABLET ORAL
Qty: 90 TABLET | Refills: 1 | Status: SHIPPED | OUTPATIENT
Start: 2020-06-05 | End: 2020-11-30

## 2020-07-06 ENCOUNTER — TELEPHONE (OUTPATIENT)
Dept: FAMILY MEDICINE CLINIC | Facility: CLINIC | Age: 60
End: 2020-07-06

## 2020-07-06 DIAGNOSIS — E78.49 FAMILIAL MULTIPLE LIPOPROTEIN-TYPE HYPERLIPIDEMIA: Primary | ICD-10-CM

## 2020-07-06 DIAGNOSIS — E79.0 HYPERURICEMIA: ICD-10-CM

## 2020-07-06 DIAGNOSIS — E29.1 MALE HYPOGONADISM: ICD-10-CM

## 2020-07-06 DIAGNOSIS — E11.9 CONTROLLED TYPE 2 DIABETES MELLITUS WITHOUT COMPLICATION, WITHOUT LONG-TERM CURRENT USE OF INSULIN (HCC): ICD-10-CM

## 2020-07-06 DIAGNOSIS — I10 BENIGN ESSENTIAL HYPERTENSION: ICD-10-CM

## 2020-07-06 RX ORDER — TESTOSTERONE 12.5 MG/1.25G
GEL TOPICAL
Qty: 150 G | Refills: 1 | Status: SHIPPED | OUTPATIENT
Start: 2020-07-06 | End: 2020-09-05

## 2020-07-06 NOTE — TELEPHONE ENCOUNTER
Future appt:    Last Appointment with provider:   Visit date not found  [place] [facility] [place]:  1/7/2020  Cholesterol, Total (mg/dL)   Date Value   08/15/2019 179     HDL Cholesterol (mg/dL)   Date Value   08/15/2019 54     LDL Cholesterol (mg/d

## 2020-08-03 RX ORDER — SIMVASTATIN 40 MG
TABLET ORAL
Qty: 90 TABLET | Refills: 0 | Status: SHIPPED | OUTPATIENT
Start: 2020-08-03 | End: 2020-09-05

## 2020-08-03 RX ORDER — TAMSULOSIN HYDROCHLORIDE 0.4 MG/1
CAPSULE ORAL
Qty: 90 CAPSULE | Refills: 0 | Status: SHIPPED | OUTPATIENT
Start: 2020-08-03 | End: 2020-09-05

## 2020-08-03 NOTE — TELEPHONE ENCOUNTER
Future appt:    Last Appointment with provider:     12/13/2019--Illness    Last appointment at [facility] [place]:  Visit date not found  Cholesterol, Total (mg/dL)   Date Value   08/15/2019 179     HDL Cholesterol (mg/dL)   Date Value   08/15/2019 54     LDL Ch

## 2020-08-05 ENCOUNTER — TELEPHONE (OUTPATIENT)
Dept: FAMILY MEDICINE CLINIC | Facility: CLINIC | Age: 60
End: 2020-08-05

## 2020-08-10 ENCOUNTER — LABORATORY ENCOUNTER (OUTPATIENT)
Dept: LAB | Age: 60
End: 2020-08-10
Attending: FAMILY MEDICINE
Payer: COMMERCIAL

## 2020-08-10 DIAGNOSIS — E11.9 CONTROLLED TYPE 2 DIABETES MELLITUS WITHOUT COMPLICATION, WITHOUT LONG-TERM CURRENT USE OF INSULIN (HCC): ICD-10-CM

## 2020-08-10 DIAGNOSIS — E29.1 MALE HYPOGONADISM: ICD-10-CM

## 2020-08-10 DIAGNOSIS — I10 BENIGN ESSENTIAL HYPERTENSION: ICD-10-CM

## 2020-08-10 DIAGNOSIS — E78.49 FAMILIAL MULTIPLE LIPOPROTEIN-TYPE HYPERLIPIDEMIA: ICD-10-CM

## 2020-08-10 DIAGNOSIS — E79.0 HYPERURICEMIA: ICD-10-CM

## 2020-08-10 LAB
ALBUMIN SERPL-MCNC: 3.5 G/DL (ref 3.4–5)
ALBUMIN/GLOB SERPL: 1 {RATIO} (ref 1–2)
ALP LIVER SERPL-CCNC: 54 U/L (ref 45–117)
ALT SERPL-CCNC: 63 U/L (ref 16–61)
ANION GAP SERPL CALC-SCNC: 8 MMOL/L (ref 0–18)
AST SERPL-CCNC: 77 U/L (ref 15–37)
BASOPHILS # BLD AUTO: 0.02 X10(3) UL (ref 0–0.2)
BASOPHILS NFR BLD AUTO: 0.5 %
BILIRUB SERPL-MCNC: 0.7 MG/DL (ref 0.1–2)
BUN BLD-MCNC: 13 MG/DL (ref 7–18)
BUN/CREAT SERPL: 13.7 (ref 10–20)
CALCIUM BLD-MCNC: 9.5 MG/DL (ref 8.5–10.1)
CHLORIDE SERPL-SCNC: 102 MMOL/L (ref 98–112)
CHOLEST SMN-MCNC: 205 MG/DL (ref ?–200)
CO2 SERPL-SCNC: 26 MMOL/L (ref 21–32)
COMPLEXED PSA SERPL-MCNC: 0.59 NG/ML (ref ?–4)
CREAT BLD-MCNC: 0.95 MG/DL (ref 0.7–1.3)
DEPRECATED RDW RBC AUTO: 48.4 FL (ref 35.1–46.3)
EOSINOPHIL # BLD AUTO: 0.21 X10(3) UL (ref 0–0.7)
EOSINOPHIL NFR BLD AUTO: 4.9 %
ERYTHROCYTE [DISTWIDTH] IN BLOOD BY AUTOMATED COUNT: 13.4 % (ref 11–15)
EST. AVERAGE GLUCOSE BLD GHB EST-MCNC: 140 MG/DL (ref 68–126)
GLOBULIN PLAS-MCNC: 3.6 G/DL (ref 2.8–4.4)
GLUCOSE BLD-MCNC: 118 MG/DL (ref 70–99)
HBA1C MFR BLD HPLC: 6.5 % (ref ?–5.7)
HCT VFR BLD AUTO: 43.1 % (ref 39–53)
HDLC SERPL-MCNC: 74 MG/DL (ref 40–59)
HGB BLD-MCNC: 14.1 G/DL (ref 13–17.5)
IMM GRANULOCYTES # BLD AUTO: 0.02 X10(3) UL (ref 0–1)
IMM GRANULOCYTES NFR BLD: 0.5 %
LDLC SERPL CALC-MCNC: 117 MG/DL (ref ?–100)
LYMPHOCYTES # BLD AUTO: 0.9 X10(3) UL (ref 1–4)
LYMPHOCYTES NFR BLD AUTO: 21.1 %
M PROTEIN MFR SERPL ELPH: 7.1 G/DL (ref 6.4–8.2)
MCH RBC QN AUTO: 31.7 PG (ref 26–34)
MCHC RBC AUTO-ENTMCNC: 32.7 G/DL (ref 31–37)
MCV RBC AUTO: 96.9 FL (ref 80–100)
MONOCYTES # BLD AUTO: 0.54 X10(3) UL (ref 0.1–1)
MONOCYTES NFR BLD AUTO: 12.7 %
NEUTROPHILS # BLD AUTO: 2.57 X10 (3) UL (ref 1.5–7.7)
NEUTROPHILS # BLD AUTO: 2.57 X10(3) UL (ref 1.5–7.7)
NEUTROPHILS NFR BLD AUTO: 60.3 %
NONHDLC SERPL-MCNC: 131 MG/DL (ref ?–130)
OSMOLALITY SERPL CALC.SUM OF ELEC: 283 MOSM/KG (ref 275–295)
PATIENT FASTING Y/N/NP: YES
PATIENT FASTING Y/N/NP: YES
PLATELET # BLD AUTO: 184 10(3)UL (ref 150–450)
POTASSIUM SERPL-SCNC: 3.8 MMOL/L (ref 3.5–5.1)
RBC # BLD AUTO: 4.45 X10(6)UL (ref 4.3–5.7)
SODIUM SERPL-SCNC: 136 MMOL/L (ref 136–145)
TESTOST SERPL-MCNC: 271.67 NG/DL
TRIGL SERPL-MCNC: 70 MG/DL (ref 30–149)
TSI SER-ACNC: 1.42 MIU/ML (ref 0.36–3.74)
URATE SERPL-MCNC: 3.4 MG/DL (ref 3.5–7.2)
VLDLC SERPL CALC-MCNC: 14 MG/DL (ref 0–30)
WBC # BLD AUTO: 4.3 X10(3) UL (ref 4–11)

## 2020-08-10 PROCEDURE — 36415 COLL VENOUS BLD VENIPUNCTURE: CPT | Performed by: FAMILY MEDICINE

## 2020-08-10 PROCEDURE — 84403 ASSAY OF TOTAL TESTOSTERONE: CPT | Performed by: FAMILY MEDICINE

## 2020-08-10 PROCEDURE — 80061 LIPID PANEL: CPT | Performed by: FAMILY MEDICINE

## 2020-08-10 PROCEDURE — 83036 HEMOGLOBIN GLYCOSYLATED A1C: CPT | Performed by: FAMILY MEDICINE

## 2020-08-10 PROCEDURE — 84550 ASSAY OF BLOOD/URIC ACID: CPT | Performed by: FAMILY MEDICINE

## 2020-08-10 PROCEDURE — 80050 GENERAL HEALTH PANEL: CPT | Performed by: FAMILY MEDICINE

## 2020-08-10 PROCEDURE — 84153 ASSAY OF PSA TOTAL: CPT | Performed by: FAMILY MEDICINE

## 2020-08-10 NOTE — TELEPHONE ENCOUNTER
Future appt:     Your appointments     Date & Time Appointment Department [facility])    Aug 10, 2020  9:15 AM CDT Laboratory Visit with REF [facility] ([facility])        Aug 28, 2020  2:30 PM CDT Physical - Established with Deb Rasmussen

## 2020-08-15 ENCOUNTER — TELEPHONE (OUTPATIENT)
Dept: FAMILY MEDICINE CLINIC | Facility: CLINIC | Age: 60
End: 2020-08-15

## 2020-08-15 NOTE — TELEPHONE ENCOUNTER
Patient states he received a letter stating his Testosterone is denied by insurance because his level is not below 300. Patient states his last level was 271. Patient states \"This doesn't make any sense. \"  Patient states he is at [facility] now where he usu

## 2020-08-15 NOTE — TELEPHONE ENCOUNTER
one of his prescriptions were denied by insurance , Testosterone 50mg  Future Appointments   Date Time Provider Chicho Gómez   8/28/2020  2:30 PM Sukhjinder Gutiérrez MD [facility] [place]

## 2020-08-17 ENCOUNTER — TELEPHONE (OUTPATIENT)
Dept: FAMILY MEDICINE CLINIC | Facility: CLINIC | Age: 60
End: 2020-08-17

## 2020-08-17 NOTE — TELEPHONE ENCOUNTER
Patient is seeing Lety tomorrow. Patient denied any other symptoms or being in contact with any one with COVID19. Ok to keep appt?     Future Appointments   Date Time Provider Chicho Gómez   8/18/2020  9:45 AM Jaime Winchester, ZACARIAS [facility] [place]

## 2020-08-18 ENCOUNTER — TELEPHONE (OUTPATIENT)
Dept: FAMILY MEDICINE CLINIC | Facility: CLINIC | Age: 60
End: 2020-08-18

## 2020-08-18 ENCOUNTER — HOSPITAL ENCOUNTER (OUTPATIENT)
Dept: GENERAL RADIOLOGY | Age: 60
Discharge: HOME | End: 2020-08-18
Attending: NURSE PRACTITIONER
Payer: COMMERCIAL

## 2020-08-18 ENCOUNTER — OFFICE VISIT (OUTPATIENT)
Dept: FAMILY MEDICINE CLINIC | Facility: CLINIC | Age: 60
End: 2020-08-18
Payer: COMMERCIAL

## 2020-08-18 VITALS
RESPIRATION RATE: 20 BRPM | OXYGEN SATURATION: 97 % | HEART RATE: 89 BPM | TEMPERATURE: 98 F | HEIGHT: 68.25 IN | SYSTOLIC BLOOD PRESSURE: 124 MMHG | BODY MASS INDEX: 37.92 KG/M2 | DIASTOLIC BLOOD PRESSURE: 68 MMHG | WEIGHT: 250.19 LBS

## 2020-08-18 DIAGNOSIS — R05.9 COUGH IN ADULT: ICD-10-CM

## 2020-08-18 DIAGNOSIS — R05.9 COUGH IN ADULT: Primary | ICD-10-CM

## 2020-08-18 PROCEDURE — 3078F DIAST BP <80 MM HG: CPT | Performed by: NURSE PRACTITIONER

## 2020-08-18 PROCEDURE — 71046 X-RAY EXAM CHEST 2 VIEWS: CPT | Performed by: NURSE PRACTITIONER

## 2020-08-18 PROCEDURE — 99214 OFFICE O/P EST MOD 30 MIN: CPT | Performed by: NURSE PRACTITIONER

## 2020-08-18 PROCEDURE — 3008F BODY MASS INDEX DOCD: CPT | Performed by: NURSE PRACTITIONER

## 2020-08-18 PROCEDURE — 3074F SYST BP LT 130 MM HG: CPT | Performed by: NURSE PRACTITIONER

## 2020-08-18 RX ORDER — AZITHROMYCIN 250 MG/1
TABLET, FILM COATED ORAL
Qty: 6 TABLET | Refills: 0 | Status: SHIPPED | OUTPATIENT
Start: 2020-08-18 | End: 2020-08-23

## 2020-08-18 NOTE — PATIENT INSTRUCTIONS
X-ray: fluid noted, Pending radiology report     Take Z-pac as directed. Increase Flovent to 2 puffs twice a day. Follow-up if not improving. If worsens, go to the ER. Otherwise follow-up as needed.

## 2020-08-18 NOTE — PROGRESS NOTES
HPI:    Patient ID: Carly Castaneda is a 60year old male. HPI     Patient has had a cough for over a year. States that it was much worse yesterday. States that he almost through up yesterday. Has been taking his inhaler and nasal steroid.    Not short o TAKE ONE TABLET BY MOUTH TWICE A  tablet 1   • ALLOPURINOL 300 MG Oral Tab TAKE ONE TABLET BY MOUTH EVERY DAY 90 tablet 1   • Potassium Citrate ER 5 MEQ (540 MG) Oral Tab CR Take 2 tablets by mouth daily.   1   • Fluticasone Propionate HFA (FLOVENT H range of motion. Lymphadenopathy:     He has no cervical adenopathy. Neurological: He is alert and oriented to person, place, and time. Skin: Skin is warm and dry. Psychiatric: He has a normal mood and affect.  His behavior is normal. Judgment and t

## 2020-08-18 NOTE — TELEPHONE ENCOUNTER
----- Message from ZACARIAS Butler sent at 8/18/2020 12:57 PM CDT -----  CXR read by radiologist as normal. Due to the increase in cough, recommend to take the Z-pac and increase the inhaler as discussed. Note to MyChart.

## 2020-08-28 ENCOUNTER — OFFICE VISIT (OUTPATIENT)
Dept: FAMILY MEDICINE CLINIC | Facility: CLINIC | Age: 60
End: 2020-08-28
Payer: COMMERCIAL

## 2020-08-28 VITALS
BODY MASS INDEX: 37.89 KG/M2 | WEIGHT: 250 LBS | DIASTOLIC BLOOD PRESSURE: 62 MMHG | HEART RATE: 92 BPM | RESPIRATION RATE: 22 BRPM | SYSTOLIC BLOOD PRESSURE: 110 MMHG | HEIGHT: 68 IN | OXYGEN SATURATION: 94 % | TEMPERATURE: 98 F

## 2020-08-28 DIAGNOSIS — F52.8 PSYCHOSEXUAL DYSFUNCTION WITH INHIBITED SEXUAL EXCITEMENT: ICD-10-CM

## 2020-08-28 DIAGNOSIS — Z98.52 S/P VASECTOMY: ICD-10-CM

## 2020-08-28 DIAGNOSIS — E79.0 HYPERURICEMIA: ICD-10-CM

## 2020-08-28 DIAGNOSIS — M24.20 LAXITY OF LIGAMENT: ICD-10-CM

## 2020-08-28 DIAGNOSIS — M15.9 PRIMARY OSTEOARTHRITIS INVOLVING MULTIPLE JOINTS: ICD-10-CM

## 2020-08-28 DIAGNOSIS — I10 BENIGN ESSENTIAL HYPERTENSION: ICD-10-CM

## 2020-08-28 DIAGNOSIS — R09.81 NASAL CONGESTION: ICD-10-CM

## 2020-08-28 DIAGNOSIS — E29.1 MALE HYPOGONADISM: ICD-10-CM

## 2020-08-28 DIAGNOSIS — N20.1 CALCULUS OF URETER: ICD-10-CM

## 2020-08-28 DIAGNOSIS — K21.9 GASTROESOPHAGEAL REFLUX DISEASE WITHOUT ESOPHAGITIS: ICD-10-CM

## 2020-08-28 DIAGNOSIS — L57.0 ACTINIC KERATOSIS: ICD-10-CM

## 2020-08-28 DIAGNOSIS — E78.49 FAMILIAL MULTIPLE LIPOPROTEIN-TYPE HYPERLIPIDEMIA: ICD-10-CM

## 2020-08-28 DIAGNOSIS — G47.33 OBSTRUCTIVE SLEEP APNEA: ICD-10-CM

## 2020-08-28 DIAGNOSIS — Z00.01 ENCOUNTER FOR GENERAL ADULT MEDICAL EXAMINATION WITH ABNORMAL FINDINGS: Primary | ICD-10-CM

## 2020-08-28 DIAGNOSIS — E11.9 CONTROLLED TYPE 2 DIABETES MELLITUS WITHOUT COMPLICATION, WITHOUT LONG-TERM CURRENT USE OF INSULIN (HCC): ICD-10-CM

## 2020-08-28 PROCEDURE — 3078F DIAST BP <80 MM HG: CPT | Performed by: FAMILY MEDICINE

## 2020-08-28 PROCEDURE — 99213 OFFICE O/P EST LOW 20 MIN: CPT | Performed by: FAMILY MEDICINE

## 2020-08-28 PROCEDURE — 99396 PREV VISIT EST AGE 40-64: CPT | Performed by: FAMILY MEDICINE

## 2020-08-28 PROCEDURE — 3008F BODY MASS INDEX DOCD: CPT | Performed by: FAMILY MEDICINE

## 2020-08-28 PROCEDURE — 3074F SYST BP LT 130 MM HG: CPT | Performed by: FAMILY MEDICINE

## 2020-08-28 NOTE — PROGRESS NOTES
[facility] [place]  PROGRESS NOTE  Chief Complaint:   Patient presents with:  Physical      HPI:   This is a 60year old male coming in for his annual wellness visit. He reports that he is feeling okay. He does still have several concerns. Yes    HEMOGLOBIN A1C   Result Value Ref Range    HgbA1C 6.5 (H) <5.7 %    Estimated Average Glucose 140 (H) 68 - 126 mg/dL   LIPID PANEL   Result Value Ref Range    Cholesterol, Total 205 (H) <200 mg/dL    HDL Cholesterol 74 (H) 40 - 59 mg/dL    Triglycer 2016   • OTHER      Cerebral angiogram   • OTHER Right     Shoulder surgery- injury   • OTHER SURGICAL HISTORY  01/17/2020    spinal surgery    • VASECTOMY  2001     Social History:  Social History    Tobacco Use      Smoking status: Never Smoker      Smok Fluticasone Propionate (FLONASE) 50 MCG/ACT Nasal Suspension 1 spray by Each Nare route 2 (two) times daily. 16 g 5   • ACCU-CHEK FASTCLIX LANCETS Does not apply Misc 3 lancet by Finger stick route daily. 1x Daily Glucose Testing.   Dx: E11.9  Non Insulin D of this encounter: 68\". Weight as of this encounter: 250 lb (113.4 kg). Vital signs reviewed. Appears stated age, well groomed. Physical Exam:  GEN:  Patient is alert, awake and oriented, well developed, well nourished, no apparent distress.   HEENT Controlled type 2 diabetes mellitus without complication, without long-term current use of insulin ([address]  He has type 2 diabetes. His hemoglobin A1c is 6.5. Plan: Continue the same medication.     6. Male hypogonadism  He does have low testosterone levels side effects or complications from the treatments as a result of today.      Problem List:  Patient Active Problem List:     Actinic keratosis     Diabetes mellitus type II, controlled ([address]     Laxity of ligament     Encounter for general adult medical exa

## 2020-09-05 RX ORDER — TAMSULOSIN HYDROCHLORIDE 0.4 MG/1
CAPSULE ORAL
Qty: 90 CAPSULE | Refills: 1 | Status: SHIPPED | OUTPATIENT
Start: 2020-09-05 | End: 2020-12-21

## 2020-09-05 RX ORDER — TESTOSTERONE 12.5 MG/1.25G
GEL TOPICAL
Qty: 150 G | Refills: 1 | Status: SHIPPED | OUTPATIENT
Start: 2020-09-05 | End: 2020-09-14

## 2020-09-05 RX ORDER — FLUTICASONE PROPIONATE 50 MCG
SPRAY, SUSPENSION (ML) NASAL
Qty: 16 G | Refills: 5 | Status: SHIPPED | OUTPATIENT
Start: 2020-09-05 | End: 2021-03-11

## 2020-09-05 RX ORDER — SIMVASTATIN 40 MG
TABLET ORAL
Qty: 90 TABLET | Refills: 1 | Status: SHIPPED | OUTPATIENT
Start: 2020-09-05 | End: 2021-02-13

## 2020-09-05 RX ORDER — TADALAFIL 20 MG/1
TABLET ORAL
Qty: 24 TABLET | Refills: 1 | Status: SHIPPED | OUTPATIENT
Start: 2020-09-05 | End: 2020-10-06

## 2020-09-05 RX ORDER — ALLOPURINOL 300 MG/1
TABLET ORAL
Qty: 90 TABLET | Refills: 1 | Status: SHIPPED | OUTPATIENT
Start: 2020-09-05 | End: 2020-10-06

## 2020-09-05 RX ORDER — FLUTICASONE PROPIONATE 220 UG/1
AEROSOL, METERED RESPIRATORY (INHALATION)
Qty: 12 G | Refills: 3 | Status: SHIPPED | OUTPATIENT
Start: 2020-09-05 | End: 2020-12-21

## 2020-09-05 NOTE — TELEPHONE ENCOUNTER
Future appt:    Last Appointment with provider:   8/28/2020  [place] [facility] [place]  8/28/2020  Cholesterol, Total (mg/dL)   Date Value   08/10/2020 205 (H)     HDL Cholesterol (mg/dL)   Date Value   08/10/2020 74 (H)     LDL Cholesterol (mg/dL)

## 2020-09-05 NOTE — TELEPHONE ENCOUNTER
Future appt:    Last Appointment with provider:   8/28/2020  [place] [facility] [place]:  8/28/2020  Cholesterol, Total (mg/dL)   Date Value   08/10/2020 205 (H)     HDL Cholesterol (mg/dL)   Date Value   08/10/2020 74 (H)     LDL Cholesterol (mg/dL)

## 2020-09-14 ENCOUNTER — PATIENT MESSAGE (OUTPATIENT)
Dept: FAMILY MEDICINE CLINIC | Facility: CLINIC | Age: 60
End: 2020-09-14

## 2020-09-14 RX ORDER — TESTOSTERONE 12.5 MG/1.25G
GEL TOPICAL
Qty: 150 G | Refills: 1 | OUTPATIENT
Start: 2020-09-14

## 2020-09-14 RX ORDER — TESTOSTERONE 12.5 MG/1.25G
1 GEL TOPICAL DAILY
Qty: 150 G | Refills: 5 | Status: SHIPPED | OUTPATIENT
Start: 2020-09-14 | End: 2021-03-11

## 2020-09-14 NOTE — TELEPHONE ENCOUNTER
Future appt:    Last Appointment with provider:   8/28/2020  Last appointment at [facility] [place]:  8/28/2020  Cholesterol, Total (mg/dL)   Date Value   08/10/2020 205 (H)     HDL Cholesterol (mg/dL)   Date Value   08/10/2020 74 (H)     LDL Cholesterol (mg/dL)

## 2020-09-16 ENCOUNTER — TELEPHONE (OUTPATIENT)
Dept: FAMILY MEDICINE CLINIC | Facility: CLINIC | Age: 60
End: 2020-09-16

## 2020-10-05 NOTE — TELEPHONE ENCOUNTER
Future appt:    Last Appointment with provider:   8/28/2020  [place] [facility] [place]  8/28/2020  Cholesterol, Total (mg/dL)   Date Value   08/10/2020 205 (H)     HDL Cholesterol (mg/dL)   Date Value   08/10/2020 74 (H)     LDL Cholesterol (mg/dL)

## 2020-10-06 RX ORDER — OMEPRAZOLE 20 MG/1
CAPSULE, DELAYED RELEASE ORAL
Qty: 90 CAPSULE | Refills: 1 | Status: SHIPPED | OUTPATIENT
Start: 2020-10-06 | End: 2020-11-06

## 2020-10-06 RX ORDER — TADALAFIL 20 MG/1
TABLET ORAL
Qty: 24 TABLET | Refills: 1 | Status: SHIPPED | OUTPATIENT
Start: 2020-10-06 | End: 2021-03-12

## 2020-10-06 RX ORDER — AMLODIPINE BESYLATE 10 MG/1
TABLET ORAL
Qty: 90 TABLET | Refills: 1 | Status: SHIPPED | OUTPATIENT
Start: 2020-10-06 | End: 2021-04-10

## 2020-10-06 RX ORDER — ALLOPURINOL 300 MG/1
TABLET ORAL
Qty: 90 TABLET | Refills: 1 | Status: SHIPPED | OUTPATIENT
Start: 2020-10-06 | End: 2021-06-17

## 2020-11-02 RX ORDER — AMLODIPINE BESYLATE 10 MG/1
TABLET ORAL
Qty: 90 TABLET | Refills: 1 | OUTPATIENT
Start: 2020-11-02

## 2020-11-02 RX ORDER — OMEPRAZOLE 20 MG/1
CAPSULE, DELAYED RELEASE ORAL
Qty: 90 CAPSULE | Refills: 1 | OUTPATIENT
Start: 2020-11-02

## 2020-11-02 NOTE — TELEPHONE ENCOUNTER
Denied-----  Duplicate Requests.       Future appt:    Last Appointment with provider:   8/28/2020  Last appointment at [facility] [place]:  8/28/2020  Cholesterol, Total (mg/dL)   Date Value   08/10/2020 205 (H)     HDL Cholesterol (mg/dL)   Date Value   08/10/2

## 2020-11-03 ENCOUNTER — TELEPHONE (OUTPATIENT)
Dept: FAMILY MEDICINE CLINIC | Facility: CLINIC | Age: 60
End: 2020-11-03

## 2020-11-03 NOTE — TELEPHONE ENCOUNTER
Patient states that he has a cough that doesn't get better. Wants appt for tomorrow- no openings. Would like a call back.

## 2020-11-03 NOTE — TELEPHONE ENCOUNTER
JUAN JOSE for patient to call back to schedule a virtual appointment by phone or video and to discuss his cough. 11/3/20 at 4:05 p.m.

## 2020-11-04 NOTE — TELEPHONE ENCOUNTER
Patient states he has had an increased cough. States he had 2 mornings where he coughed until he vomited. Has not had anymore mornings like this. He does have an intermittent cough during the day. Using his inhaler.     Has not been around anyone wi

## 2020-11-06 ENCOUNTER — OFFICE VISIT (OUTPATIENT)
Dept: FAMILY MEDICINE CLINIC | Facility: CLINIC | Age: 60
End: 2020-11-06
Payer: COMMERCIAL

## 2020-11-06 VITALS
TEMPERATURE: 98 F | OXYGEN SATURATION: 95 % | RESPIRATION RATE: 20 BRPM | HEIGHT: 68 IN | SYSTOLIC BLOOD PRESSURE: 120 MMHG | BODY MASS INDEX: 37.07 KG/M2 | WEIGHT: 244.63 LBS | DIASTOLIC BLOOD PRESSURE: 64 MMHG | HEART RATE: 84 BPM

## 2020-11-06 DIAGNOSIS — R05.9 COUGH: Primary | ICD-10-CM

## 2020-11-06 PROCEDURE — 3078F DIAST BP <80 MM HG: CPT | Performed by: NURSE PRACTITIONER

## 2020-11-06 PROCEDURE — 99213 OFFICE O/P EST LOW 20 MIN: CPT | Performed by: NURSE PRACTITIONER

## 2020-11-06 PROCEDURE — 99072 ADDL SUPL MATRL&STAF TM PHE: CPT | Performed by: NURSE PRACTITIONER

## 2020-11-06 PROCEDURE — 3008F BODY MASS INDEX DOCD: CPT | Performed by: NURSE PRACTITIONER

## 2020-11-06 PROCEDURE — 3074F SYST BP LT 130 MM HG: CPT | Performed by: NURSE PRACTITIONER

## 2020-11-06 RX ORDER — OMEPRAZOLE 20 MG/1
1 TABLET, DELAYED RELEASE ORAL
COMMUNITY

## 2020-11-06 NOTE — PROGRESS NOTES
[facility] [place]  PROGRESS NOTE  Chief Complaint:   Patient presents with:  Cough: phlem, gagging, vomiting every morning because of gag reflex      HPI:   This is a 63year old male coming in for cough in the morning, phlegm, vomiting because Alcohol use:  Yes      Alcohol/week: 10.0 - 12.0 standard drinks      Types: 10 - 12 Shots of liquor per week      Comment: 2 drinks daily    Drug use: No    Social History    Social History Narrative      Not on file    Family History:  Family History   Pr Magnesium 20 MG Oral Tab EC Take 1 tablet by mouth. Counseling given: Not Answered       REVIEW OF SYSTEMS:   CONSTITUTIONAL:  Denies unusual weight gain/loss, fever, chills, or fatigue.   EENT:  Eyes:  Denies eye pain, visual loss, blurred vision, d oriented, well developed, well nourished, no apparent distress.   HEENT:  Head:  Normocephalic, atraumatic Eyes: EOMI, PERRLA, no scleral icterus, conjunctivae clear bilaterally, no eye discharge Ears: External normal.   Nose: patent; bilateral nasal mucosa day, use with lukewarm distilled water.   Do the neti pot at the end of the day after returning home/inside  Avoid dairy as you are currently doing  Continue with inhalers, mucinex, and your nasal flonase  If no significant improvement over the next week, n

## 2020-11-06 NOTE — PATIENT INSTRUCTIONS
Start a daily loratadine (Claritin) once a day, okay to take with elevated blood pressure. Start a neti pot rinse once a day, use with lukewarm distilled water.   Do the neti pot at the end of the day after returning home/inside  Avoid dairy as you are cur

## 2020-11-27 ENCOUNTER — TELEPHONE (OUTPATIENT)
Dept: FAMILY MEDICINE CLINIC | Facility: CLINIC | Age: 60
End: 2020-11-27

## 2020-11-27 ENCOUNTER — TELEPHONE (OUTPATIENT)
Dept: OTOLARYNGOLOGY | Age: 60
End: 2020-11-27

## 2020-11-27 NOTE — TELEPHONE ENCOUNTER
It does not appear [facility] has a specific ENT specialists, they have other groups they're contracted with, including [facility].    The best bet would be to sign onto his insurance company website or call his insurance company to get a few options who are Tier 1 provi

## 2020-11-27 NOTE — TELEPHONE ENCOUNTER
Patient informed of the below recommendations. States he will need to see someone within [facility]. Please advise.

## 2020-11-27 NOTE — TELEPHONE ENCOUNTER
Local options are Dr. Anthony Bundy [facility]) or [facility] in [place] (about 20 minutes from here) Dr. Ankita Henao and associates. Can check with his insurance plan to see who is in network and I'll place referral to who he wants to see.

## 2020-11-30 ENCOUNTER — OFFICE VISIT (OUTPATIENT)
Dept: OTOLARYNGOLOGY | Age: 60
End: 2020-11-30

## 2020-11-30 ENCOUNTER — TELEPHONE (OUTPATIENT)
Dept: FAMILY MEDICINE CLINIC | Facility: CLINIC | Age: 60
End: 2020-11-30

## 2020-11-30 DIAGNOSIS — J31.0 RHINITIS, UNSPECIFIED TYPE: ICD-10-CM

## 2020-11-30 DIAGNOSIS — R05.9 COUGH: Primary | ICD-10-CM

## 2020-11-30 DIAGNOSIS — K21.9 LPRD (LARYNGOPHARYNGEAL REFLUX DISEASE): ICD-10-CM

## 2020-11-30 DIAGNOSIS — R09.82 PND (POST-NASAL DRIP): ICD-10-CM

## 2020-11-30 PROCEDURE — 31575 DIAGNOSTIC LARYNGOSCOPY: CPT | Performed by: OTOLARYNGOLOGY

## 2020-11-30 PROCEDURE — 99204 OFFICE O/P NEW MOD 45 MIN: CPT | Performed by: OTOLARYNGOLOGY

## 2020-11-30 RX ORDER — TESTOSTERONE GEL, 1% 10 MG/G
GEL TRANSDERMAL
COMMUNITY
Start: 2020-11-30

## 2020-11-30 RX ORDER — ALLOPURINOL 300 MG/1
TABLET ORAL
COMMUNITY
Start: 2020-11-29

## 2020-11-30 RX ORDER — FLUTICASONE PROPIONATE 220 UG/1
AEROSOL, METERED RESPIRATORY (INHALATION)
COMMUNITY
Start: 2020-11-29

## 2020-11-30 RX ORDER — LISINOPRIL AND HYDROCHLOROTHIAZIDE 12.5; 1 MG/1; MG/1
1 TABLET ORAL DAILY
COMMUNITY
Start: 2020-11-02

## 2020-11-30 RX ORDER — OMEPRAZOLE 20 MG/1
CAPSULE, DELAYED RELEASE ORAL
COMMUNITY
Start: 2020-11-29

## 2020-11-30 RX ORDER — TADALAFIL 20 MG/1
TABLET ORAL
COMMUNITY
Start: 2020-11-29

## 2020-11-30 RX ORDER — LISINOPRIL AND HYDROCHLOROTHIAZIDE 12.5; 1 MG/1; MG/1
TABLET ORAL
Qty: 90 TABLET | Refills: 1 | Status: SHIPPED | OUTPATIENT
Start: 2020-11-30 | End: 2020-12-21

## 2020-11-30 RX ORDER — METOPROLOL SUCCINATE 50 MG/1
TABLET, EXTENDED RELEASE ORAL
COMMUNITY
Start: 2020-11-30

## 2020-11-30 RX ORDER — SIMVASTATIN 40 MG
TABLET ORAL
COMMUNITY
Start: 2020-11-29

## 2020-11-30 RX ORDER — FLUTICASONE PROPIONATE 50 MCG
2 SPRAY, SUSPENSION (ML) NASAL DAILY
Qty: 16 G | Refills: 1 | Status: SHIPPED | OUTPATIENT
Start: 2020-11-30

## 2020-11-30 RX ORDER — METOPROLOL SUCCINATE 50 MG/1
TABLET, EXTENDED RELEASE ORAL
Qty: 90 TABLET | Refills: 1 | Status: SHIPPED | OUTPATIENT
Start: 2020-11-30 | End: 2020-12-21

## 2020-11-30 RX ORDER — FLUTICASONE PROPIONATE 50 MCG
SPRAY, SUSPENSION (ML) NASAL
COMMUNITY
Start: 2020-11-29

## 2020-11-30 RX ORDER — TAMSULOSIN HYDROCHLORIDE 0.4 MG/1
CAPSULE ORAL
COMMUNITY
Start: 2020-11-29

## 2020-11-30 RX ORDER — AMLODIPINE BESYLATE 10 MG/1
TABLET ORAL
COMMUNITY
Start: 2020-11-29

## 2020-11-30 NOTE — TELEPHONE ENCOUNTER
Being seen today at ENT- [address] [facility]. Needs records faxed over [phone].

## 2020-11-30 NOTE — TELEPHONE ENCOUNTER
Lisinopril:  Metoprolol/HCTZ  6/5/20    Future appt:    Last Appointment with provider:   8/28/2020  Last [place] [facility] [place]:  11/6/2020  Cholesterol, Total (mg/dL)   Date Value   08/10/2020 205 (H)     HDL Cholesterol (mg/dL)   Date Value   08/10

## 2020-11-30 NOTE — TELEPHONE ENCOUNTER
Patient being seen at [facility] for Cough. Requesting OV/CXR done by Jeffery Conley to be faxed to above phone number/done.   Naina Jefferson, 11/30/20, 9:58 AM

## 2020-12-14 ENCOUNTER — TELEPHONE (OUTPATIENT)
Dept: FAMILY MEDICINE CLINIC | Facility: CLINIC | Age: 60
End: 2020-12-14

## 2020-12-14 DIAGNOSIS — Z20.822 EXPOSURE TO COVID-19 VIRUS: Primary | ICD-10-CM

## 2020-12-14 NOTE — TELEPHONE ENCOUNTER
If he does not have any symptoms it is possible that he may not get it. Ideally, would be good for both of you to isolate. You should isolate in separate rooms. He may be tested if he chooses but in any case he should be isolated for a full 10 days.

## 2020-12-14 NOTE — TELEPHONE ENCOUNTER
From: Arturo Berry      Created: 12/11/2020 6:07 PM        *-*-*This message has not been handled. *-*-*    I tested positive for Covid. Does Keith Vasquez need to test or can we just assume he is also positive and we isolate together for the next 10 days?

## 2020-12-21 ENCOUNTER — TELEPHONE (OUTPATIENT)
Dept: FAMILY MEDICINE CLINIC | Facility: CLINIC | Age: 60
End: 2020-12-21

## 2020-12-21 RX ORDER — TAMSULOSIN HYDROCHLORIDE 0.4 MG/1
CAPSULE ORAL
Qty: 90 CAPSULE | Refills: 1 | Status: SHIPPED | OUTPATIENT
Start: 2020-12-21 | End: 2021-02-13

## 2020-12-21 RX ORDER — LISINOPRIL AND HYDROCHLOROTHIAZIDE 12.5; 1 MG/1; MG/1
TABLET ORAL
Qty: 90 TABLET | Refills: 1 | Status: SHIPPED | OUTPATIENT
Start: 2020-12-21 | End: 2021-06-23

## 2020-12-21 RX ORDER — METOPROLOL SUCCINATE 50 MG/1
TABLET, EXTENDED RELEASE ORAL
Qty: 90 TABLET | Refills: 1 | Status: SHIPPED | OUTPATIENT
Start: 2020-12-21 | End: 2021-08-09

## 2020-12-21 RX ORDER — FLUTICASONE PROPIONATE 220 UG/1
AEROSOL, METERED RESPIRATORY (INHALATION)
Qty: 3 INHALER | Refills: 1 | Status: SHIPPED | OUTPATIENT
Start: 2020-12-21 | End: 2021-07-16

## 2020-12-21 NOTE — TELEPHONE ENCOUNTER
Flovent/Tamsulosin:  9/5/20  Metoprolol/ Lisinopril-HCTZ: 11/30/20    Future appt:    Last Appointment with provider:   8/28/2020  [place] [facility] [place] [place]:  11/6/2020  Cholesterol, Total (mg/dL)   Date Value   08/10/2020 205 (H)     HDL Cholestero

## 2020-12-21 NOTE — TELEPHONE ENCOUNTER
Patient informed of below. He has been in contact with [facility] and is out of Quarantine.   Toy Alvarez, 12/21/20, 5:29 PM

## 2021-01-04 ENCOUNTER — TELEPHONE (OUTPATIENT)
Dept: FAMILY MEDICINE CLINIC | Facility: CLINIC | Age: 61
End: 2021-01-04

## 2021-01-04 NOTE — TELEPHONE ENCOUNTER
LM for patient to return call and schedule a sleep follow up with either Dr. Vinicio Toscano or Foster Wu in order to renew CPAP supplies, 1/4.

## 2021-01-18 RX ORDER — TAMSULOSIN HYDROCHLORIDE 0.4 MG/1
CAPSULE ORAL
Qty: 90 CAPSULE | Refills: 0 | OUTPATIENT
Start: 2021-01-18

## 2021-01-18 NOTE — TELEPHONE ENCOUNTER
Tamsulosin:  12/21/20    Future appt:     Your appointments     Date & Time Appointment Department [facility])    Jan 21, 2021 10:10 AM CST Sleep Follow Up with Wilberto Rico MD [facility], Kellie Mccoy ([facility])

## 2021-01-21 ENCOUNTER — LAB ENCOUNTER (OUTPATIENT)
Dept: LAB | Age: 61
End: 2021-01-21
Attending: FAMILY MEDICINE
Payer: COMMERCIAL

## 2021-01-21 ENCOUNTER — OFFICE VISIT (OUTPATIENT)
Dept: FAMILY MEDICINE CLINIC | Facility: CLINIC | Age: 61
End: 2021-01-21
Payer: COMMERCIAL

## 2021-01-21 VITALS
WEIGHT: 243 LBS | HEIGHT: 68 IN | RESPIRATION RATE: 16 BRPM | BODY MASS INDEX: 36.83 KG/M2 | DIASTOLIC BLOOD PRESSURE: 88 MMHG | OXYGEN SATURATION: 96 % | HEART RATE: 80 BPM | SYSTOLIC BLOOD PRESSURE: 118 MMHG

## 2021-01-21 DIAGNOSIS — G47.33 OBSTRUCTIVE SLEEP APNEA: Primary | ICD-10-CM

## 2021-01-21 DIAGNOSIS — E11.9 CONTROLLED TYPE 2 DIABETES MELLITUS WITHOUT COMPLICATION, WITHOUT LONG-TERM CURRENT USE OF INSULIN (HCC): ICD-10-CM

## 2021-01-21 DIAGNOSIS — E55.9 VITAMIN D DEFICIENCY: ICD-10-CM

## 2021-01-21 LAB
VIT B12 SERPL-MCNC: 172 PG/ML (ref 193–986)
VIT D+METAB SERPL-MCNC: 46.8 NG/ML (ref 30–100)

## 2021-01-21 PROCEDURE — 99214 OFFICE O/P EST MOD 30 MIN: CPT | Performed by: FAMILY MEDICINE

## 2021-01-21 PROCEDURE — 36415 COLL VENOUS BLD VENIPUNCTURE: CPT | Performed by: FAMILY MEDICINE

## 2021-01-21 PROCEDURE — 3074F SYST BP LT 130 MM HG: CPT | Performed by: FAMILY MEDICINE

## 2021-01-21 PROCEDURE — 3079F DIAST BP 80-89 MM HG: CPT | Performed by: FAMILY MEDICINE

## 2021-01-21 PROCEDURE — 3008F BODY MASS INDEX DOCD: CPT | Performed by: FAMILY MEDICINE

## 2021-01-21 PROCEDURE — 82306 VITAMIN D 25 HYDROXY: CPT | Performed by: FAMILY MEDICINE

## 2021-01-21 PROCEDURE — 82607 VITAMIN B-12: CPT | Performed by: FAMILY MEDICINE

## 2021-01-21 NOTE — PROGRESS NOTES
[employer] [facility]  SLEEP PROGRESS [facility]        HPI:   This is a 63year old male coming in for Patient presents with:  Obstructive Sleep Apnea (THEODORA)      HPI: states that he is doing well overall. He is using his cpap nightly.   He brings his mo HISTORY  01/17/2020    spinal surgery    • VASECTOMY  2001     Social History:  Social History    Social History Narrative      Not on file    Social History    Tobacco Use      Smoking status: Never Smoker      Smokeless tobacco: Never Used    Alcohol use 3 lancet by Finger stick route daily. 1x Daily Glucose Testing. Dx: E11.9  Non Insulin Dependent (Patient taking differently: 1 lancet by Finger stick route daily. 1x Daily Glucose Testing.   Dx: E11.9  Non Insulin Dependent ) 100 each 3      Counseling gi reviewed. Physical Exam   Constitutional: He is oriented to person, place, and time. He appears well-developed and well-nourished. HENT:   Head: Normocephalic and atraumatic.    Right Ear: External ear normal.   Left Ear: External ear normal.   Nose: Nos 6 month  with the Durable medical equipment provider. Clean regularly. Follow-up and Disposition     Dispositions    · Return in about 6 months (around 7/21/2021).                   Advised if still with sleep apnea and not using CPAP

## 2021-01-22 ENCOUNTER — TELEPHONE (OUTPATIENT)
Dept: FAMILY MEDICINE CLINIC | Facility: CLINIC | Age: 61
End: 2021-01-22

## 2021-01-22 DIAGNOSIS — E53.8 B12 DEFICIENCY: Primary | ICD-10-CM

## 2021-01-22 NOTE — TELEPHONE ENCOUNTER
----- Message from Radha Mcgraw MD sent at 1/22/2021  9:15 AM CST -----  Patient with severe vitamin b12 deficiency,   b12 complex twice a day x 1 month and then recheck b12 level   If not improving may need injections. Vitamin D looks good.

## 2021-02-13 RX ORDER — SIMVASTATIN 40 MG
TABLET ORAL
Qty: 90 TABLET | Refills: 1 | Status: SHIPPED | OUTPATIENT
Start: 2021-02-13 | End: 2021-02-22

## 2021-02-13 RX ORDER — TAMSULOSIN HYDROCHLORIDE 0.4 MG/1
CAPSULE ORAL
Qty: 90 CAPSULE | Refills: 1 | Status: SHIPPED | OUTPATIENT
Start: 2021-02-13 | End: 2021-06-17

## 2021-02-13 NOTE — TELEPHONE ENCOUNTER
Tamsulosin: 12/21/20  Simvastatin: 9/5/20    Future appt:    Last Appointment with provider:   8/28/2020  Last appointment at [facility]:  1/21/2021  Cholesterol, Total (mg/dL)   Date Value   08/10/2020 205 (H)     HDL Cholesterol (mg/dL)   Date Value

## 2021-02-22 RX ORDER — SIMVASTATIN 40 MG
TABLET ORAL
Qty: 90 TABLET | Refills: 1 | Status: SHIPPED | OUTPATIENT
Start: 2021-02-22 | End: 2021-06-17

## 2021-02-22 NOTE — TELEPHONE ENCOUNTER
Simvastatin: 2/13/21    Future appt:    Last Appointment with provider:   8/28/2020  Last appointment at [facility]:  1/21/2021  Cholesterol, Total (mg/dL)   Date Value   08/10/2020 205 (H)     HDL Cholesterol (mg/dL)   Date Value   08/10/2020 74 (H)

## 2021-03-03 DIAGNOSIS — Z23 NEED FOR VACCINATION: ICD-10-CM

## 2021-03-06 ENCOUNTER — IMMUNIZATION (OUTPATIENT)
Dept: LAB | Age: 61
End: 2021-03-06
Attending: HOSPITALIST
Payer: COMMERCIAL

## 2021-03-06 DIAGNOSIS — Z23 NEED FOR VACCINATION: Primary | ICD-10-CM

## 2021-03-06 PROCEDURE — 0001A SARSCOV2 VAC 30MCG/0.3ML IM: CPT

## 2021-03-11 NOTE — TELEPHONE ENCOUNTER
Future appt:     Your appointments     Date & Time Appointment Department [facility])    Mar 22, 2021  8:30 AM CDT Follow Up Visit with Rachel Braden MD 25 [facility], [facility] ([facility])        Mar 22, 2021  9:00 version  [email or web address]. pdf     COVID-19 Vaccine Consent Form for Employees of [facility]  [facility].

## 2021-03-11 NOTE — TELEPHONE ENCOUNTER
Future appt:     Your appointments     Date & Time Appointment Department [facility])    Mar 22, 2021  8:30 AM CDT Follow Up Visit with Gómez Mon MD [address] [place] [address] ([facility])        Mar 22, 2021  9:00 version  [email or web address] pdf     COVID-19 Vaccine Consent Form for Employees of [facility]  [facility].

## 2021-03-11 NOTE — TELEPHONE ENCOUNTER
Future appt:     Your appointments     Date & Time Appointment Department [facility])    Mar 22, 2021  8:30 AM CDT Follow Up Visit with Irvin Gold MD [facility], [facility] ([facility])        Mar 22, 2021  9:00 version  [email or web address] pdf     COVID-19 Vaccine Consent Form for Employees of [facility].

## 2021-03-12 RX ORDER — TADALAFIL 20 MG/1
TABLET ORAL
Qty: 24 TABLET | Refills: 1 | Status: SHIPPED | OUTPATIENT
Start: 2021-03-12 | End: 2021-09-11

## 2021-03-12 RX ORDER — TESTOSTERONE 12.5 MG/1.25G
50 GEL TOPICAL DAILY
Qty: 150 G | Refills: 5 | Status: SHIPPED | OUTPATIENT
Start: 2021-03-12 | End: 2021-09-11

## 2021-03-12 RX ORDER — FLUTICASONE PROPIONATE 50 MCG
SPRAY, SUSPENSION (ML) NASAL
Qty: 3 BOTTLE | Refills: 3 | Status: SHIPPED | OUTPATIENT
Start: 2021-03-12 | End: 2021-04-10

## 2021-03-22 ENCOUNTER — LAB ENCOUNTER (OUTPATIENT)
Dept: LAB | Age: 61
End: 2021-03-22
Attending: FAMILY MEDICINE
Payer: COMMERCIAL

## 2021-03-22 ENCOUNTER — OFFICE VISIT (OUTPATIENT)
Dept: FAMILY MEDICINE CLINIC | Facility: CLINIC | Age: 61
End: 2021-03-22
Payer: COMMERCIAL

## 2021-03-22 VITALS
BODY MASS INDEX: 37.13 KG/M2 | HEIGHT: 68 IN | DIASTOLIC BLOOD PRESSURE: 84 MMHG | OXYGEN SATURATION: 96 % | TEMPERATURE: 98 F | WEIGHT: 245 LBS | RESPIRATION RATE: 18 BRPM | SYSTOLIC BLOOD PRESSURE: 110 MMHG | HEART RATE: 75 BPM

## 2021-03-22 DIAGNOSIS — M15.9 PRIMARY OSTEOARTHRITIS INVOLVING MULTIPLE JOINTS: ICD-10-CM

## 2021-03-22 DIAGNOSIS — I10 BENIGN ESSENTIAL HYPERTENSION: ICD-10-CM

## 2021-03-22 DIAGNOSIS — E29.1 MALE HYPOGONADISM: ICD-10-CM

## 2021-03-22 DIAGNOSIS — Z86.16 HISTORY OF COVID-19: ICD-10-CM

## 2021-03-22 DIAGNOSIS — E11.9 CONTROLLED TYPE 2 DIABETES MELLITUS WITHOUT COMPLICATION, WITHOUT LONG-TERM CURRENT USE OF INSULIN (HCC): Primary | ICD-10-CM

## 2021-03-22 DIAGNOSIS — K21.9 GASTROESOPHAGEAL REFLUX DISEASE WITHOUT ESOPHAGITIS: ICD-10-CM

## 2021-03-22 DIAGNOSIS — E78.49 FAMILIAL MULTIPLE LIPOPROTEIN-TYPE HYPERLIPIDEMIA: ICD-10-CM

## 2021-03-22 DIAGNOSIS — G47.33 OBSTRUCTIVE SLEEP APNEA: ICD-10-CM

## 2021-03-22 DIAGNOSIS — E11.42 CONTROLLED TYPE 2 DIABETES MELLITUS WITH DIABETIC POLYNEUROPATHY, WITHOUT LONG-TERM CURRENT USE OF INSULIN (HCC): ICD-10-CM

## 2021-03-22 LAB
ALBUMIN SERPL-MCNC: 3.8 G/DL (ref 3.4–5)
ALBUMIN/GLOB SERPL: 1.1 {RATIO} (ref 1–2)
ALP LIVER SERPL-CCNC: 51 U/L
ALT SERPL-CCNC: 33 U/L
ANION GAP SERPL CALC-SCNC: 6 MMOL/L (ref 0–18)
AST SERPL-CCNC: 17 U/L (ref 15–37)
BILIRUB SERPL-MCNC: 0.5 MG/DL (ref 0.1–2)
BUN BLD-MCNC: 19 MG/DL (ref 7–18)
BUN/CREAT SERPL: 19.6 (ref 10–20)
CALCIUM BLD-MCNC: 9.8 MG/DL (ref 8.5–10.1)
CHLORIDE SERPL-SCNC: 104 MMOL/L (ref 98–112)
CO2 SERPL-SCNC: 28 MMOL/L (ref 21–32)
CREAT BLD-MCNC: 0.97 MG/DL
EST. AVERAGE GLUCOSE BLD GHB EST-MCNC: 148 MG/DL (ref 68–126)
GLOBULIN PLAS-MCNC: 3.4 G/DL (ref 2.8–4.4)
GLUCOSE BLD-MCNC: 119 MG/DL (ref 70–99)
HBA1C MFR BLD HPLC: 6.8 % (ref ?–5.7)
M PROTEIN MFR SERPL ELPH: 7.2 G/DL (ref 6.4–8.2)
OSMOLALITY SERPL CALC.SUM OF ELEC: 289 MOSM/KG (ref 275–295)
PATIENT FASTING Y/N/NP: NO
POTASSIUM SERPL-SCNC: 3.7 MMOL/L (ref 3.5–5.1)
SODIUM SERPL-SCNC: 138 MMOL/L (ref 136–145)
VIT B12 SERPL-MCNC: 288 PG/ML (ref 193–986)

## 2021-03-22 PROCEDURE — 99214 OFFICE O/P EST MOD 30 MIN: CPT | Performed by: FAMILY MEDICINE

## 2021-03-22 PROCEDURE — 36415 COLL VENOUS BLD VENIPUNCTURE: CPT | Performed by: FAMILY MEDICINE

## 2021-03-22 PROCEDURE — 3044F HG A1C LEVEL LT 7.0%: CPT | Performed by: FAMILY MEDICINE

## 2021-03-22 PROCEDURE — 3079F DIAST BP 80-89 MM HG: CPT | Performed by: FAMILY MEDICINE

## 2021-03-22 PROCEDURE — 3074F SYST BP LT 130 MM HG: CPT | Performed by: FAMILY MEDICINE

## 2021-03-22 PROCEDURE — 82607 VITAMIN B-12: CPT | Performed by: FAMILY MEDICINE

## 2021-03-22 PROCEDURE — 83036 HEMOGLOBIN GLYCOSYLATED A1C: CPT | Performed by: FAMILY MEDICINE

## 2021-03-22 PROCEDURE — 3008F BODY MASS INDEX DOCD: CPT | Performed by: FAMILY MEDICINE

## 2021-03-22 PROCEDURE — 80053 COMPREHEN METABOLIC PANEL: CPT | Performed by: FAMILY MEDICINE

## 2021-03-22 RX ORDER — AMOXICILLIN 500 MG/1
CAPSULE ORAL
COMMUNITY
Start: 2021-02-23 | End: 2021-09-01

## 2021-03-22 RX ORDER — FLUCONAZOLE 100 MG/1
100 TABLET ORAL DAILY
COMMUNITY
Start: 2021-03-02 | End: 2021-03-23

## 2021-03-22 NOTE — PROGRESS NOTES
[address]  PROGRESS NOTE  Chief Complaint:   Patient presents with: Follow - Up      HPI:   This is a 63year old male coming in for diabetes follow-up. He said that he has not had any high or low blood sugar readings.   He is not chec History   Problem Relation Age of Onset   • Hypertension Father    • Hypertension Mother      Allergies:    No Known Allergies        Current Meds:  Current Outpatient Medications   Medication Sig Dispense Refill   • NON FORMULARY by Misc.  (Non-Drug;Combo APPOINTMENT OR PROCEDURE (Patient not taking: Reported on 3/22/2021)        Counseling given: Not Answered       REVIEW OF SYSTEMS:   CONSTITUTIONAL:  Denies unusual weight gain/loss, fever, chills, or fatigue.   EENT:  Eyes:  Denies eye pain, visual loss, Normocephalic, atraumatic Eyes: EOMI, PERRLA, no scleral icterus, conjunctivae clear bilaterally, no eye discharge Ears: External normal. Nose: patent, no nasal discharge Throat:  No tonsillar erythema or exudate.   Mouth:  No oral lesions or ulcerations, g a very symptomatic case of COVID-19. No new treatment now.       Meds & Refills for this Visit:  Requested Prescriptions      No prescriptions requested or ordered in this encounter       Health Maintenance:  Zoster Vaccines(1 of 2) Never done  Diabetes Ca

## 2021-03-23 ENCOUNTER — TELEPHONE (OUTPATIENT)
Dept: FAMILY MEDICINE CLINIC | Facility: CLINIC | Age: 61
End: 2021-03-23

## 2021-03-23 NOTE — TELEPHONE ENCOUNTER
----- Message from Susan Peng MD sent at 3/23/2021  8:37 AM CDT -----  Good news. The labs look good. Hemoglobin A1c is 6.8 which means good control of the diabetes. Blood sugar is 119 which is good. Continue the same medications.

## 2021-03-23 NOTE — TELEPHONE ENCOUNTER
----- Message from ZACARIAS Perkins sent at 3/23/2021  9:06 AM CDT -----  B12 is improving. It is still a low normal.  Recommend to continue supplement.   Note to MyChart

## 2021-03-27 ENCOUNTER — IMMUNIZATION (OUTPATIENT)
Dept: LAB | Age: 61
End: 2021-03-27
Attending: HOSPITALIST
Payer: COMMERCIAL

## 2021-03-27 DIAGNOSIS — Z23 NEED FOR VACCINATION: Primary | ICD-10-CM

## 2021-03-27 PROCEDURE — 0002A SARSCOV2 VAC 30MCG/0.3ML IM: CPT

## 2021-04-05 ENCOUNTER — OFFICE VISIT (OUTPATIENT)
Dept: FAMILY MEDICINE CLINIC | Facility: CLINIC | Age: 61
End: 2021-04-05
Payer: COMMERCIAL

## 2021-04-05 VITALS
BODY MASS INDEX: 38.04 KG/M2 | TEMPERATURE: 98 F | WEIGHT: 251 LBS | RESPIRATION RATE: 16 BRPM | HEART RATE: 91 BPM | HEIGHT: 68 IN | OXYGEN SATURATION: 94 % | DIASTOLIC BLOOD PRESSURE: 66 MMHG | SYSTOLIC BLOOD PRESSURE: 110 MMHG

## 2021-04-05 DIAGNOSIS — G62.9 NEUROPATHY: Primary | ICD-10-CM

## 2021-04-05 DIAGNOSIS — E11.9 CONTROLLED TYPE 2 DIABETES MELLITUS WITHOUT COMPLICATION, WITHOUT LONG-TERM CURRENT USE OF INSULIN (HCC): ICD-10-CM

## 2021-04-05 PROCEDURE — 3008F BODY MASS INDEX DOCD: CPT | Performed by: NURSE PRACTITIONER

## 2021-04-05 PROCEDURE — 3078F DIAST BP <80 MM HG: CPT | Performed by: NURSE PRACTITIONER

## 2021-04-05 PROCEDURE — 99214 OFFICE O/P EST MOD 30 MIN: CPT | Performed by: NURSE PRACTITIONER

## 2021-04-05 PROCEDURE — 3074F SYST BP LT 130 MM HG: CPT | Performed by: NURSE PRACTITIONER

## 2021-04-05 NOTE — PROGRESS NOTES
Hunter Hidalgo is a 63year old male. Patient presents with:   Foot Pain      HPI:   Complaints of pain to right foot 1 hr after going to sleep started having shooting pain -  Last night   Took Advil   Has been hurting ever since    4th toe - tip of toe - MOUTH EVERY DAY 90 tablet 1   • LISINOPRIL-HYDROCHLOROTHIAZIDE 10-12.5 MG Oral Tab TAKE ONE TABLET BY MOUTH EVERY DAY 90 tablet 1   • FLOVENT  MCG/ACT Inhalation Aerosol INHALE ONE PUFF BY MOUTH TWICE A DAY 3 Inhaler 1   • Omeprazole Magnesium 20 MG exertion, no cough  CARDIOVASCULAR: denies complaints   GI: denies abdominal pain, nausea, vomiting, diarrhea   MUSCULOSKELETAL:  See HPI   NEURO: see HPI   PSYCH:denies complaints     EXAM:   /66   Pulse 91   Temp 98.3 °F (36.8 °C)   Resp 16   Ht 5'

## 2021-04-05 NOTE — PATIENT INSTRUCTIONS
Advil 800 mg 1 by mouth every 8 hrs with food- or aleve 2 pills every 12 hrs.       Rest,  Follow up if symptoms persist or increase -

## 2021-04-10 RX ORDER — AMLODIPINE BESYLATE 10 MG/1
TABLET ORAL
Qty: 90 TABLET | Refills: 1 | Status: SHIPPED | OUTPATIENT
Start: 2021-04-10 | End: 2021-10-13

## 2021-04-10 RX ORDER — FLUTICASONE PROPIONATE 50 MCG
SPRAY, SUSPENSION (ML) NASAL
Qty: 3 BOTTLE | Refills: 1 | Status: SHIPPED | OUTPATIENT
Start: 2021-04-10 | End: 2021-11-12

## 2021-04-10 RX ORDER — OMEPRAZOLE 20 MG/1
CAPSULE, DELAYED RELEASE ORAL
Qty: 90 CAPSULE | Refills: 1 | Status: SHIPPED | OUTPATIENT
Start: 2021-04-10 | End: 2021-10-13

## 2021-04-10 NOTE — TELEPHONE ENCOUNTER
Omeprazole:   Amlodipine/Metformin:  10/6/20    Future appt:    Last Appointment with provider:   Visit date not found  Last appointment at [facility] [place]:  4/5/2021  Cholesterol, Total (mg/dL)   Date Value   08/10/2020 205 (H)     HDL Cholesterol (mg/dL)

## 2021-04-10 NOTE — TELEPHONE ENCOUNTER
Fluticasone: 3/12/21    Needing to be resent.   Samantha Tenorio, 04/10/21, 9:47 AM    Future appt:    Last Appointment with provider:   3/22/2021  Last appointment at [facility]:  4/5/2021  Cholesterol, Total (mg/dL)   Date Value   08/10/2020 205 (H)     HDL

## 2021-05-20 RX ORDER — AMLODIPINE BESYLATE 10 MG/1
TABLET ORAL
Qty: 90 TABLET | Refills: 1 | OUTPATIENT
Start: 2021-05-20

## 2021-06-17 DIAGNOSIS — E79.0 HYPERURICEMIA: ICD-10-CM

## 2021-06-17 DIAGNOSIS — E78.49 FAMILIAL MULTIPLE LIPOPROTEIN-TYPE HYPERLIPIDEMIA: Primary | ICD-10-CM

## 2021-06-17 RX ORDER — SIMVASTATIN 40 MG
TABLET ORAL
Qty: 90 TABLET | Refills: 1 | Status: SHIPPED | OUTPATIENT
Start: 2021-06-17 | End: 2021-12-06

## 2021-06-17 RX ORDER — TAMSULOSIN HYDROCHLORIDE 0.4 MG/1
CAPSULE ORAL
Qty: 90 CAPSULE | Refills: 1 | Status: SHIPPED | OUTPATIENT
Start: 2021-06-17 | End: 2021-12-06

## 2021-06-17 RX ORDER — ALLOPURINOL 300 MG/1
TABLET ORAL
Qty: 90 TABLET | Refills: 1 | Status: SHIPPED | OUTPATIENT
Start: 2021-06-17 | End: 2021-12-06

## 2021-06-17 NOTE — TELEPHONE ENCOUNTER
Future appt:     Last Appointment with provider:   3/22/2021; Return in about 6 months (around 9/22/2021).     Last appointment at [facility]:  4/5/2021  Cholesterol, Total (mg/dL)   Date Value   08/10/2020 205 (H)     HDL Cholesterol (mg/dL)   Date Value

## 2021-06-22 ENCOUNTER — TELEPHONE (OUTPATIENT)
Dept: FAMILY MEDICINE CLINIC | Facility: CLINIC | Age: 61
End: 2021-06-22

## 2021-06-22 NOTE — TELEPHONE ENCOUNTER
As below. Appt given with  tomorrow for evaluation of  Cough. Pilar Magana, 06/22/21, 8:48 AM    Future appt:     Your appointments     Date & Time Appointment Department [facility])    Jun 23, 2021  2:30 PM CDT Exam - Established with Eliz Salcedo

## 2021-06-22 NOTE — TELEPHONE ENCOUNTER
Pt calling seeking in office appt with PCP. Pt states he has had a cough that he has been in treatment for over a year. Pt feels his cough has been getting worse in the last few months and now sometimes keeps him up at night.  Please advise and c/b for appt

## 2021-06-23 ENCOUNTER — OFFICE VISIT (OUTPATIENT)
Dept: FAMILY MEDICINE CLINIC | Facility: CLINIC | Age: 61
End: 2021-06-23
Payer: COMMERCIAL

## 2021-06-23 VITALS
TEMPERATURE: 98 F | WEIGHT: 246.63 LBS | HEART RATE: 84 BPM | RESPIRATION RATE: 16 BRPM | BODY MASS INDEX: 37.38 KG/M2 | HEIGHT: 68 IN | DIASTOLIC BLOOD PRESSURE: 66 MMHG | SYSTOLIC BLOOD PRESSURE: 112 MMHG

## 2021-06-23 DIAGNOSIS — R05.9 COUGH: Primary | ICD-10-CM

## 2021-06-23 DIAGNOSIS — I10 BENIGN ESSENTIAL HYPERTENSION: ICD-10-CM

## 2021-06-23 PROCEDURE — 3078F DIAST BP <80 MM HG: CPT | Performed by: FAMILY MEDICINE

## 2021-06-23 PROCEDURE — 3074F SYST BP LT 130 MM HG: CPT | Performed by: FAMILY MEDICINE

## 2021-06-23 PROCEDURE — 3008F BODY MASS INDEX DOCD: CPT | Performed by: FAMILY MEDICINE

## 2021-06-23 PROCEDURE — 99214 OFFICE O/P EST MOD 30 MIN: CPT | Performed by: FAMILY MEDICINE

## 2021-06-23 RX ORDER — HYDROCHLOROTHIAZIDE 12.5 MG/1
12.5 TABLET ORAL DAILY
Qty: 90 TABLET | Refills: 1 | Status: SHIPPED | OUTPATIENT
Start: 2021-06-23 | End: 2021-12-06

## 2021-06-23 NOTE — PROGRESS NOTES
[facility] [place]  PROGRESS NOTE  Chief Complaint:   Patient presents with:  Cough      HPI:   This is a 63year old male coming in for a cough. He notes that he has been coughing for the last several months.   He describes it as a tickle in th Hyperlipidemia    • Osteoarthritis    • Sleep apnea    • Testosterone deficiency      Past Surgical History:   Procedure Laterality Date   • COLONOSCOPY     • KNEE ARTHROSCOPY Right Sept 2016   • OTHER      Cerebral angiogram   • OTHER Right     Shoulder s topically daily.  150 g 5   • METOPROLOL SUCCINATE ER 50 MG Oral Tablet 24 Hr TAKE ONE TABLET BY MOUTH EVERY DAY 90 tablet 1   • FLOVENT  MCG/ACT Inhalation Aerosol INHALE ONE PUFF BY MOUTH TWICE A DAY 3 Inhaler 1   • Omeprazole Magnesium 20 MG Oral polydipsia. ALLERGIES:  Denies allergic response, history of asthma, sneezing, hives, eczema or rhinitis.      EXAM:   /66 (BP Location: Left arm, Patient Position: Sitting, Cuff Size: large)   Pulse 84   Temp 98.4 °F (36.9 °C) (Tympanic)   Resp 16 Refills for this Visit:  Requested Prescriptions     Signed Prescriptions Disp Refills   • hydrochlorothiazide 12.5 MG Oral Tab 90 tablet 1     Sig: Take 1 tablet (12.5 mg total) by mouth daily.        Health Maintenance:  Zoster Vaccines(1 of 2) Never done

## 2021-07-15 NOTE — TELEPHONE ENCOUNTER
Future appt:     Your appointments     Date & Time Appointment Department [facility])    Jul 23, 2021  3:00 PM CDT Exam - Established with Elsa Torres MD [address] [place] [address])            [employer]

## 2021-07-16 RX ORDER — FLUTICASONE PROPIONATE 220 UG/1
AEROSOL, METERED RESPIRATORY (INHALATION)
Qty: 36 G | Refills: 1 | Status: SHIPPED | OUTPATIENT
Start: 2021-07-16

## 2021-07-23 ENCOUNTER — OFFICE VISIT (OUTPATIENT)
Dept: FAMILY MEDICINE CLINIC | Facility: CLINIC | Age: 61
End: 2021-07-23
Payer: COMMERCIAL

## 2021-07-23 VITALS
SYSTOLIC BLOOD PRESSURE: 116 MMHG | TEMPERATURE: 99 F | BODY MASS INDEX: 38.19 KG/M2 | OXYGEN SATURATION: 97 % | HEIGHT: 68 IN | RESPIRATION RATE: 16 BRPM | DIASTOLIC BLOOD PRESSURE: 70 MMHG | WEIGHT: 252 LBS | HEART RATE: 80 BPM

## 2021-07-23 DIAGNOSIS — R05.9 COUGH: Primary | ICD-10-CM

## 2021-07-23 DIAGNOSIS — I10 BENIGN ESSENTIAL HYPERTENSION: ICD-10-CM

## 2021-07-23 DIAGNOSIS — E78.49 FAMILIAL MULTIPLE LIPOPROTEIN-TYPE HYPERLIPIDEMIA: ICD-10-CM

## 2021-07-23 DIAGNOSIS — E11.9 CONTROLLED TYPE 2 DIABETES MELLITUS WITHOUT COMPLICATION, WITHOUT LONG-TERM CURRENT USE OF INSULIN (HCC): ICD-10-CM

## 2021-07-23 PROCEDURE — 3078F DIAST BP <80 MM HG: CPT | Performed by: FAMILY MEDICINE

## 2021-07-23 PROCEDURE — 90471 IMMUNIZATION ADMIN: CPT | Performed by: FAMILY MEDICINE

## 2021-07-23 PROCEDURE — 99213 OFFICE O/P EST LOW 20 MIN: CPT | Performed by: FAMILY MEDICINE

## 2021-07-23 PROCEDURE — 3008F BODY MASS INDEX DOCD: CPT | Performed by: FAMILY MEDICINE

## 2021-07-23 PROCEDURE — 3074F SYST BP LT 130 MM HG: CPT | Performed by: FAMILY MEDICINE

## 2021-07-23 PROCEDURE — 90750 HZV VACC RECOMBINANT IM: CPT | Performed by: FAMILY MEDICINE

## 2021-07-23 NOTE — PATIENT INSTRUCTIONS
Stay off Lisinopril. Continue to take Hydrochlorothiazide daily. Shingrix #1 today. Return in 1 month for a diabetes check; labs then.

## 2021-07-23 NOTE — PROGRESS NOTES
[address]  PROGRESS NOTE  Chief Complaint:   Patient presents with: Follow - Up      HPI:   This is a 63year old male coming in for follow-up on the change in his blood pressure. He reports that his cough has improved dramatically. ARTHROSCOPY Right Sept 2016   • OTHER      Cerebral angiogram   • OTHER Right     Shoulder surgery- injury   • OTHER SURGICAL HISTORY  01/17/2020    spinal surgery    • VASECTOMY  2001     Social History:  Social History    Tobacco Use      Smoking status: Potassium Citrate ER 5 MEQ (540 MG) Oral Tab CR Take 2 tablets by mouth daily. 1   • ACCU-CHEK FASTCLIX LANCETS Does not apply Misc 3 lancet by Finger stick route daily. 1x Daily Glucose Testing.   Dx: E11.9  Non Insulin Dependent (Patient taking different polyuria or polydipsia. ALLERGIES:  Denies allergic response, history of asthma, sneezing, hives, eczema or rhinitis.      EXAM:   /70   Pulse 80   Temp 98.7 °F (37.1 °C)   Resp 16   Ht 5' 8\" (1.727 m)   Wt 252 lb (114.3 kg)   SpO2 97%   BMI 38.32 k next month. - CBC WITH DIFFERENTIAL WITH PLATELET; Future  - COMP METABOLIC PANEL (14); Future  - HEMOGLOBIN A1C; Future  - LIPID PANEL; Future  - MICROALB/CREAT RATIO, RANDOM URINE;  Future  - PSA SCREEN; Future  - ASSAY, THYROID STIM HORMONE; Future  - U

## 2021-08-09 RX ORDER — METOPROLOL SUCCINATE 50 MG/1
TABLET, EXTENDED RELEASE ORAL
Qty: 90 TABLET | Refills: 1 | Status: SHIPPED | OUTPATIENT
Start: 2021-08-09 | End: 2022-02-07

## 2021-08-23 ENCOUNTER — TELEPHONE (OUTPATIENT)
Dept: FAMILY MEDICINE CLINIC | Facility: CLINIC | Age: 61
End: 2021-08-23

## 2021-08-23 DIAGNOSIS — E29.1 MALE HYPOGONADISM: Primary | ICD-10-CM

## 2021-08-23 NOTE — TELEPHONE ENCOUNTER
Patient would also like his Testosterone  level checked -please add to orders.   coming in tomorrow for BW

## 2021-08-24 ENCOUNTER — LAB ENCOUNTER (OUTPATIENT)
Dept: LAB | Age: 61
End: 2021-08-24
Attending: FAMILY MEDICINE
Payer: COMMERCIAL

## 2021-08-24 DIAGNOSIS — E78.49 FAMILIAL MULTIPLE LIPOPROTEIN-TYPE HYPERLIPIDEMIA: ICD-10-CM

## 2021-08-24 DIAGNOSIS — E11.9 CONTROLLED TYPE 2 DIABETES MELLITUS WITHOUT COMPLICATION, WITHOUT LONG-TERM CURRENT USE OF INSULIN (HCC): ICD-10-CM

## 2021-08-24 DIAGNOSIS — E29.1 MALE HYPOGONADISM: ICD-10-CM

## 2021-08-24 LAB
ALBUMIN SERPL-MCNC: 3.6 G/DL (ref 3.4–5)
ALBUMIN/GLOB SERPL: 1.1 {RATIO} (ref 1–2)
ALP LIVER SERPL-CCNC: 51 U/L
ALT SERPL-CCNC: 30 U/L
ANION GAP SERPL CALC-SCNC: 4 MMOL/L (ref 0–18)
AST SERPL-CCNC: 21 U/L (ref 15–37)
BASOPHILS # BLD AUTO: 0.03 X10(3) UL (ref 0–0.2)
BASOPHILS NFR BLD AUTO: 0.6 %
BILIRUB SERPL-MCNC: 0.6 MG/DL (ref 0.1–2)
BUN BLD-MCNC: 17 MG/DL (ref 7–18)
CALCIUM BLD-MCNC: 8.8 MG/DL (ref 8.5–10.1)
CHLORIDE SERPL-SCNC: 106 MMOL/L (ref 98–112)
CHOLEST SMN-MCNC: 177 MG/DL (ref ?–200)
CO2 SERPL-SCNC: 28 MMOL/L (ref 21–32)
COMPLEXED PSA SERPL-MCNC: 0.85 NG/ML (ref ?–4)
CREAT BLD-MCNC: 1.05 MG/DL
CREAT UR-SCNC: 240 MG/DL
EOSINOPHIL # BLD AUTO: 0.23 X10(3) UL (ref 0–0.7)
EOSINOPHIL NFR BLD AUTO: 4.4 %
ERYTHROCYTE [DISTWIDTH] IN BLOOD BY AUTOMATED COUNT: 13.2 %
EST. AVERAGE GLUCOSE BLD GHB EST-MCNC: 143 MG/DL (ref 68–126)
GLOBULIN PLAS-MCNC: 3.3 G/DL (ref 2.8–4.4)
GLUCOSE BLD-MCNC: 109 MG/DL (ref 70–99)
HBA1C MFR BLD HPLC: 6.6 % (ref ?–5.7)
HCT VFR BLD AUTO: 44.4 %
HDLC SERPL-MCNC: 61 MG/DL (ref 40–59)
HGB BLD-MCNC: 14.3 G/DL
IMM GRANULOCYTES # BLD AUTO: 0.02 X10(3) UL (ref 0–1)
IMM GRANULOCYTES NFR BLD: 0.4 %
LDLC SERPL CALC-MCNC: 99 MG/DL (ref ?–100)
LYMPHOCYTES # BLD AUTO: 1.31 X10(3) UL (ref 1–4)
LYMPHOCYTES NFR BLD AUTO: 25 %
M PROTEIN MFR SERPL ELPH: 6.9 G/DL (ref 6.4–8.2)
MCH RBC QN AUTO: 30.2 PG (ref 26–34)
MCHC RBC AUTO-ENTMCNC: 32.2 G/DL (ref 31–37)
MCV RBC AUTO: 93.7 FL
MICROALBUMIN UR-MCNC: 1.08 MG/DL
MICROALBUMIN/CREAT 24H UR-RTO: 4.5 UG/MG (ref ?–30)
MONOCYTES # BLD AUTO: 0.53 X10(3) UL (ref 0.1–1)
MONOCYTES NFR BLD AUTO: 10.1 %
NEUTROPHILS # BLD AUTO: 3.11 X10 (3) UL (ref 1.5–7.7)
NEUTROPHILS # BLD AUTO: 3.11 X10(3) UL (ref 1.5–7.7)
NEUTROPHILS NFR BLD AUTO: 59.5 %
NONHDLC SERPL-MCNC: 116 MG/DL (ref ?–130)
OSMOLALITY SERPL CALC.SUM OF ELEC: 288 MOSM/KG (ref 275–295)
PATIENT FASTING Y/N/NP: YES
PATIENT FASTING Y/N/NP: YES
PLATELET # BLD AUTO: 192 10(3)UL (ref 150–450)
POTASSIUM SERPL-SCNC: 4.2 MMOL/L (ref 3.5–5.1)
RBC # BLD AUTO: 4.74 X10(6)UL
SODIUM SERPL-SCNC: 138 MMOL/L (ref 136–145)
TESTOST SERPL-MCNC: 458.94 NG/DL
TRIGL SERPL-MCNC: 96 MG/DL (ref 30–149)
TSI SER-ACNC: 1.16 MIU/ML (ref 0.36–3.74)
URATE SERPL-MCNC: 4 MG/DL
VLDLC SERPL CALC-MCNC: 16 MG/DL (ref 0–30)
WBC # BLD AUTO: 5.2 X10(3) UL (ref 4–11)

## 2021-08-24 PROCEDURE — 3044F HG A1C LEVEL LT 7.0%: CPT | Performed by: FAMILY MEDICINE

## 2021-08-24 PROCEDURE — 82043 UR ALBUMIN QUANTITATIVE: CPT | Performed by: FAMILY MEDICINE

## 2021-08-24 PROCEDURE — 80061 LIPID PANEL: CPT | Performed by: FAMILY MEDICINE

## 2021-08-24 PROCEDURE — 84153 ASSAY OF PSA TOTAL: CPT | Performed by: FAMILY MEDICINE

## 2021-08-24 PROCEDURE — 82570 ASSAY OF URINE CREATININE: CPT | Performed by: FAMILY MEDICINE

## 2021-08-24 PROCEDURE — 84550 ASSAY OF BLOOD/URIC ACID: CPT | Performed by: FAMILY MEDICINE

## 2021-08-24 PROCEDURE — 84403 ASSAY OF TOTAL TESTOSTERONE: CPT | Performed by: FAMILY MEDICINE

## 2021-08-24 PROCEDURE — 83036 HEMOGLOBIN GLYCOSYLATED A1C: CPT | Performed by: FAMILY MEDICINE

## 2021-08-24 PROCEDURE — 80050 GENERAL HEALTH PANEL: CPT | Performed by: FAMILY MEDICINE

## 2021-08-24 PROCEDURE — 3061F NEG MICROALBUMINURIA REV: CPT | Performed by: FAMILY MEDICINE

## 2021-09-01 ENCOUNTER — OFFICE VISIT (OUTPATIENT)
Dept: FAMILY MEDICINE CLINIC | Facility: CLINIC | Age: 61
End: 2021-09-01
Payer: COMMERCIAL

## 2021-09-01 VITALS
OXYGEN SATURATION: 98 % | RESPIRATION RATE: 16 BRPM | HEIGHT: 68 IN | DIASTOLIC BLOOD PRESSURE: 70 MMHG | WEIGHT: 245.81 LBS | BODY MASS INDEX: 37.25 KG/M2 | HEART RATE: 80 BPM | TEMPERATURE: 98 F | SYSTOLIC BLOOD PRESSURE: 126 MMHG

## 2021-09-01 DIAGNOSIS — Z00.01 ENCOUNTER FOR GENERAL ADULT MEDICAL EXAMINATION WITH ABNORMAL FINDINGS: Primary | ICD-10-CM

## 2021-09-01 DIAGNOSIS — I10 BENIGN ESSENTIAL HYPERTENSION: ICD-10-CM

## 2021-09-01 DIAGNOSIS — Z98.52 S/P VASECTOMY: ICD-10-CM

## 2021-09-01 DIAGNOSIS — Z86.16 HISTORY OF COVID-19: ICD-10-CM

## 2021-09-01 DIAGNOSIS — E29.1 MALE HYPOGONADISM: ICD-10-CM

## 2021-09-01 DIAGNOSIS — K21.9 GASTROESOPHAGEAL REFLUX DISEASE WITHOUT ESOPHAGITIS: ICD-10-CM

## 2021-09-01 DIAGNOSIS — M15.9 PRIMARY OSTEOARTHRITIS INVOLVING MULTIPLE JOINTS: ICD-10-CM

## 2021-09-01 DIAGNOSIS — L57.0 ACTINIC KERATOSIS: ICD-10-CM

## 2021-09-01 DIAGNOSIS — E11.42 CONTROLLED TYPE 2 DIABETES MELLITUS WITH DIABETIC POLYNEUROPATHY, WITHOUT LONG-TERM CURRENT USE OF INSULIN (HCC): ICD-10-CM

## 2021-09-01 DIAGNOSIS — F52.8 PSYCHOSEXUAL DYSFUNCTION WITH INHIBITED SEXUAL EXCITEMENT: ICD-10-CM

## 2021-09-01 DIAGNOSIS — G47.33 OBSTRUCTIVE SLEEP APNEA: ICD-10-CM

## 2021-09-01 DIAGNOSIS — E78.49 FAMILIAL MULTIPLE LIPOPROTEIN-TYPE HYPERLIPIDEMIA: ICD-10-CM

## 2021-09-01 PROBLEM — R05.9 COUGH: Status: RESOLVED | Noted: 2021-06-23 | Resolved: 2021-09-01

## 2021-09-01 PROBLEM — R09.81 NASAL CONGESTION: Status: RESOLVED | Noted: 2019-12-13 | Resolved: 2021-09-01

## 2021-09-01 PROBLEM — E79.0 HYPERURICEMIA: Status: RESOLVED | Noted: 2019-08-20 | Resolved: 2021-09-01

## 2021-09-01 PROCEDURE — 99213 OFFICE O/P EST LOW 20 MIN: CPT | Performed by: FAMILY MEDICINE

## 2021-09-01 PROCEDURE — 99396 PREV VISIT EST AGE 40-64: CPT | Performed by: FAMILY MEDICINE

## 2021-09-01 PROCEDURE — 3078F DIAST BP <80 MM HG: CPT | Performed by: FAMILY MEDICINE

## 2021-09-01 PROCEDURE — 3074F SYST BP LT 130 MM HG: CPT | Performed by: FAMILY MEDICINE

## 2021-09-01 PROCEDURE — 3008F BODY MASS INDEX DOCD: CPT | Performed by: FAMILY MEDICINE

## 2021-09-02 NOTE — PROGRESS NOTES
[facility] [place]  PROGRESS NOTE  Chief Complaint:   Patient presents with:  Physical      HPI:   This is a 63year old male coming in for his annual wellness exam.    He reports that he is feeling fairly good overall.   Business is very stressf Total Protein 6.9 6.4 - 8.2 g/dL    Albumin 3.6 3.4 - 5.0 g/dL    Globulin  3.3 2.8 - 4.4 g/dL    A/G Ratio 1.1 1.0 - 2.0    FASTING Yes    HEMOGLOBIN A1C   Result Value Ref Range    HgbA1C 6.6 (H) <5.7 %    Estimated Average Glucose 143 (H) 68 - 126 mg/dL reflux    • Essential hypertension    • Hyperlipidemia    • Osteoarthritis    • Sleep apnea    • Testosterone deficiency      Past Surgical History:   Procedure Laterality Date   • COLONOSCOPY     • KNEE ARTHROSCOPY Right Sept 2016   • OTHER      Cerebral Oral Tab TAKE AS DIRECTED 24 tablet 1   • Testosterone 50 MG/5GM (1%) Transdermal Gel Apply 50 mg topically daily. 150 g 5   • Omeprazole Magnesium 20 MG Oral Tab EC Take 1 tablet by mouth. • Glucose Blood In Vitro CARY Haq Next Strips.   3x °C) (Temporal)   Resp 16   Ht 5' 8\" (1.727 m)   Wt 245 lb 12.8 oz (111.5 kg)   SpO2 98%   BMI 37.37 kg/m²  Estimated body mass index is 37.37 kg/m² as calculated from the following:    Height as of this encounter: 5' 8\" (1.727 m).     Weight as of this en diabetes with neuropathy. His A1c is 6.6. Plan: Continue the same diabetes medications.  - COMP METABOLIC PANEL (14); Future  - HEMOGLOBIN A1C; Future    3. Benign essential hypertension  Blood pressure is well controlled with the current meds.   No cyr Actinic keratosis     Controlled type 2 diabetes mellitus with diabetic polyneuropathy, without long-term current use of insulin ([facility] [address]     Encounter for general adult medical examination with abnormal findings     Psychosexual dysfunction with inhibited se

## 2021-09-11 RX ORDER — TADALAFIL 20 MG/1
TABLET ORAL
Qty: 24 TABLET | Refills: 1 | Status: SHIPPED | OUTPATIENT
Start: 2021-09-11

## 2021-09-11 RX ORDER — TESTOSTERONE 12.5 MG/1.25G
GEL TOPICAL
Qty: 150 G | Refills: 5 | Status: SHIPPED | OUTPATIENT
Start: 2021-09-11

## 2021-09-11 NOTE — TELEPHONE ENCOUNTER
Tadalafil/Testosterone: 3/12/21     Return in about 6 months (around 3/1/2022).     Future appt:    Last Appointment with provider:   9/1/2021  Last appointment at [facility] [place]:  9/1/2021  Cholesterol, Total (mg/dL)   Date Value   08/24/2021 177     HDL Cho

## 2021-09-17 ENCOUNTER — TELEPHONE (OUTPATIENT)
Dept: FAMILY MEDICINE CLINIC | Facility: CLINIC | Age: 61
End: 2021-09-17

## 2021-09-27 ENCOUNTER — NURSE ONLY (OUTPATIENT)
Dept: FAMILY MEDICINE CLINIC | Facility: CLINIC | Age: 61
End: 2021-09-27
Payer: COMMERCIAL

## 2021-09-27 DIAGNOSIS — Z23 NEED FOR SHINGLES VACCINE: ICD-10-CM

## 2021-09-27 PROCEDURE — 90750 HZV VACC RECOMBINANT IM: CPT | Performed by: FAMILY MEDICINE

## 2021-09-27 PROCEDURE — 90471 IMMUNIZATION ADMIN: CPT | Performed by: FAMILY MEDICINE

## 2021-09-27 NOTE — PROGRESS NOTES
2nd Shingles Vaccine given left deltoid without incident. Patient left in stable condition.   Nikko Greenwood CMA, 09/27/21, 3:19 PM

## 2021-10-12 NOTE — TELEPHONE ENCOUNTER
Omeprazole/Amlodipine/Metformin: 4/10/21    Future appt:    Last Appointment with provider:   9/1/2021  Last appointment at [facility]:  9/1/2021  Cholesterol, Total (mg/dL)   Date Value   08/24/2021 177     HDL Cholesterol (mg/dL)   Date Value   08/24/2

## 2021-10-13 RX ORDER — OMEPRAZOLE 20 MG/1
CAPSULE, DELAYED RELEASE ORAL
Qty: 90 CAPSULE | Refills: 1 | Status: SHIPPED | OUTPATIENT
Start: 2021-10-13

## 2021-10-13 RX ORDER — AMLODIPINE BESYLATE 10 MG/1
TABLET ORAL
Qty: 90 TABLET | Refills: 1 | Status: SHIPPED | OUTPATIENT
Start: 2021-10-13

## 2021-11-03 ENCOUNTER — OFFICE VISIT (OUTPATIENT)
Dept: FAMILY MEDICINE CLINIC | Facility: CLINIC | Age: 61
End: 2021-11-03
Payer: COMMERCIAL

## 2021-11-03 ENCOUNTER — TELEPHONE (OUTPATIENT)
Dept: FAMILY MEDICINE CLINIC | Facility: CLINIC | Age: 61
End: 2021-11-03

## 2021-11-03 VITALS
TEMPERATURE: 97 F | DIASTOLIC BLOOD PRESSURE: 78 MMHG | RESPIRATION RATE: 16 BRPM | BODY MASS INDEX: 37.19 KG/M2 | HEART RATE: 80 BPM | WEIGHT: 245.38 LBS | OXYGEN SATURATION: 96 % | HEIGHT: 68 IN | SYSTOLIC BLOOD PRESSURE: 126 MMHG

## 2021-11-03 DIAGNOSIS — H61.21 HEARING LOSS OF RIGHT EAR DUE TO CERUMEN IMPACTION: Primary | ICD-10-CM

## 2021-11-03 PROCEDURE — 99212 OFFICE O/P EST SF 10 MIN: CPT | Performed by: FAMILY MEDICINE

## 2021-11-03 PROCEDURE — 3074F SYST BP LT 130 MM HG: CPT | Performed by: FAMILY MEDICINE

## 2021-11-03 PROCEDURE — 69209 REMOVE IMPACTED EAR WAX UNI: CPT | Performed by: FAMILY MEDICINE

## 2021-11-03 PROCEDURE — 3008F BODY MASS INDEX DOCD: CPT | Performed by: FAMILY MEDICINE

## 2021-11-03 PROCEDURE — 3078F DIAST BP <80 MM HG: CPT | Performed by: FAMILY MEDICINE

## 2021-11-03 NOTE — TELEPHONE ENCOUNTER
Wife states patient's ears are plugged especially one side. Patient has to wear ear plugs for his job. Informed there are no openings today/tomorrow at [facility]. Patient will go to immediate care.   Santhosh Green CMA, 11/03/21, 1:56 PM

## 2021-11-03 NOTE — PROGRESS NOTES
Chief Complaint:   Patient presents with:  Ear Problem: Pt states difficulty hearing due to wax      HPI:   This is a 57year old male coming in for walkin - ear pain with hearing loss   Had been using hydrogen peroxide.      Otherwise feeling well   No fev 150 g 5   • TADALAFIL 20 MG Oral Tab TAKE AS DIRECTED 24 tablet 1   • METOPROLOL SUCCINATE 50 MG Oral Tablet 24 Hr TAKE ONE TABLET BY MOUTH EVERY DAY 90 tablet 1   • FLOVENT  MCG/ACT Inhalation Aerosol INHALE ONE PUFF BY MOUTH TWICE A DAY 36 g 1   • 6.4 oz (111.3 kg). Vital signs reviewed. Appears stated age, well groomed. Physical Exam:    GEN:  Patient is alert, awake and oriented, well developed, well nourished  Male    , no apparent distress.   HEENT:  Head:  Normocephalic, atraumatic   Eyes: E

## 2021-11-08 PROBLEM — H61.21 HEARING LOSS OF RIGHT EAR DUE TO CERUMEN IMPACTION: Status: ACTIVE | Noted: 2021-11-08

## 2021-11-12 RX ORDER — FLUTICASONE PROPIONATE 50 MCG
SPRAY, SUSPENSION (ML) NASAL
Qty: 48 G | Refills: 1 | Status: SHIPPED | OUTPATIENT
Start: 2021-11-12

## 2021-11-12 NOTE — TELEPHONE ENCOUNTER
Future appt:     Last Appointment with provider:   9/1/2021- advised Return in about 6 months (around 3/1/2022).       Last refill: 4/10/21- fluticasone 3 bottles  1 refill    Cholesterol, Total (mg/dL)   Date Value   08/24/2021 177     HDL Cholesterol (mg/

## 2021-12-01 ENCOUNTER — OFFICE VISIT (OUTPATIENT)
Dept: FAMILY MEDICINE CLINIC | Facility: CLINIC | Age: 61
End: 2021-12-01
Payer: COMMERCIAL

## 2021-12-01 ENCOUNTER — TELEPHONE (OUTPATIENT)
Dept: FAMILY MEDICINE CLINIC | Facility: CLINIC | Age: 61
End: 2021-12-01

## 2021-12-01 VITALS
BODY MASS INDEX: 37.74 KG/M2 | TEMPERATURE: 98 F | RESPIRATION RATE: 18 BRPM | WEIGHT: 249 LBS | HEIGHT: 68 IN | DIASTOLIC BLOOD PRESSURE: 80 MMHG | HEART RATE: 81 BPM | SYSTOLIC BLOOD PRESSURE: 136 MMHG | OXYGEN SATURATION: 97 %

## 2021-12-01 DIAGNOSIS — R05.9 COUGH: Primary | ICD-10-CM

## 2021-12-01 DIAGNOSIS — E11.42 CONTROLLED TYPE 2 DIABETES MELLITUS WITH DIABETIC POLYNEUROPATHY, WITHOUT LONG-TERM CURRENT USE OF INSULIN (HCC): ICD-10-CM

## 2021-12-01 PROCEDURE — 3008F BODY MASS INDEX DOCD: CPT | Performed by: NURSE PRACTITIONER

## 2021-12-01 PROCEDURE — 3079F DIAST BP 80-89 MM HG: CPT | Performed by: NURSE PRACTITIONER

## 2021-12-01 PROCEDURE — 99214 OFFICE O/P EST MOD 30 MIN: CPT | Performed by: NURSE PRACTITIONER

## 2021-12-01 PROCEDURE — 3075F SYST BP GE 130 - 139MM HG: CPT | Performed by: NURSE PRACTITIONER

## 2021-12-01 RX ORDER — ALBUTEROL SULFATE 90 UG/1
AEROSOL, METERED RESPIRATORY (INHALATION)
Qty: 1 EACH | Refills: 1 | Status: SHIPPED | OUTPATIENT
Start: 2021-12-01

## 2021-12-01 NOTE — PROGRESS NOTES
CHIEF COMPLAINT:   Patient presents with:  Cough      HPI:   Gina Kumar is a 57year old male who presents to clinic today with complaints of cough    Was with daughter with bronchitis   No body aches, no fever,   No nasal congestion   No sore throa • ACCU-CHEK FASTCLIX LANCETS Does not apply Misc 3 lancet by Finger stick route daily. 1x Daily Glucose Testing. Dx: E11.9  Non Insulin Dependent (Patient taking differently: 1 lancet by Finger stick route daily. 1x Daily Glucose Testing.   Dx: E11.9  No with  ASSESSMENT:  Cough  (primary encounter diagnosis)  Controlled type 2 diabetes mellitus with diabetic polyneuropathy, without long-term current use of insulin (hcc)    PLAN: Meds as listed below.   Comfort measures as described in Patient Instructions

## 2021-12-01 NOTE — PATIENT INSTRUCTIONS
Rest, increase fluids, salt water gargles ,neti pot (use distilled water) or saline nasal spray, Mucinex-DM, Tylenol/Ibuprofen, follow up if symptoms persist or increase.       Use albuterol inhaler 1 to 2 puffs every 4-6 hours as needed for cough–use this

## 2021-12-01 NOTE — TELEPHONE ENCOUNTER
Patient has a chronic cough. Wife states daughter was here for thanksgiving and diagnosed with bronchitis. Patient's cough is different. Now it sounds more congested. Per Néstor Elliott to see at 1pm today. Appt given. Future appt:     Your appoin Group [place]  [address] [facility]  [place] [id number]  [facility] [address]  [address] [facility] [facility] [address] [place] [address]        Last Appointment with provider:   9/1/2021  Last appoint

## 2021-12-01 NOTE — TELEPHONE ENCOUNTER
Pt has chronic cough but sounds different lately. Would like to discuss with nurse to see if she recommends making an appt.

## 2021-12-05 ENCOUNTER — IMMUNIZATION (OUTPATIENT)
Dept: LAB | Facility: HOSPITAL | Age: 61
End: 2021-12-05
Attending: EMERGENCY MEDICINE
Payer: COMMERCIAL

## 2021-12-05 DIAGNOSIS — E78.49 FAMILIAL MULTIPLE LIPOPROTEIN-TYPE HYPERLIPIDEMIA: ICD-10-CM

## 2021-12-05 DIAGNOSIS — Z23 NEED FOR VACCINATION: Primary | ICD-10-CM

## 2021-12-05 DIAGNOSIS — E79.0 HYPERURICEMIA: ICD-10-CM

## 2021-12-05 PROCEDURE — 0004A SARSCOV2 VAC 30MCG/0.3ML IM: CPT | Performed by: NURSE PRACTITIONER

## 2021-12-06 RX ORDER — SIMVASTATIN 40 MG
TABLET ORAL
Qty: 90 TABLET | Refills: 1 | Status: SHIPPED | OUTPATIENT
Start: 2021-12-06

## 2021-12-06 RX ORDER — TAMSULOSIN HYDROCHLORIDE 0.4 MG/1
CAPSULE ORAL
Qty: 90 CAPSULE | Refills: 1 | Status: SHIPPED | OUTPATIENT
Start: 2021-12-06

## 2021-12-06 RX ORDER — ALLOPURINOL 300 MG/1
TABLET ORAL
Qty: 90 TABLET | Refills: 1 | Status: SHIPPED | OUTPATIENT
Start: 2021-12-06

## 2021-12-06 RX ORDER — HYDROCHLOROTHIAZIDE 12.5 MG/1
TABLET ORAL
Qty: 90 TABLET | Refills: 1 | Status: SHIPPED | OUTPATIENT
Start: 2021-12-06

## 2021-12-06 NOTE — TELEPHONE ENCOUNTER
Simvastatin/Tamsulosin/HCTZ/Allopurinol:  6/17/21    Future appt:    Last Appointment with provider:   9/1/2021  Last appointment at [facility]:  12/1/2021  Cholesterol, Total (mg/dL)   Date Value   08/24/2021 177     HDL Cholesterol (mg/dL)   Date Value

## 2022-02-07 RX ORDER — METOPROLOL SUCCINATE 50 MG/1
TABLET, EXTENDED RELEASE ORAL
Qty: 90 TABLET | Refills: 1 | Status: SHIPPED | OUTPATIENT
Start: 2022-02-07

## 2022-02-20 ENCOUNTER — PATIENT MESSAGE (OUTPATIENT)
Dept: FAMILY MEDICINE CLINIC | Facility: CLINIC | Age: 62
End: 2022-02-20

## 2022-02-21 NOTE — TELEPHONE ENCOUNTER
I had a heart attack in October 2021. As a result of that, I have markedly decreased the number of appointments available. I would like you to come in to be seen in March. It would be very acceptable for you to see one of the midlevel providers here for that visit. I would like to see you in September and it is not too early to schedule that appointment. The orders are in the chart for you to have lab work done prior to your visit in March.

## 2022-02-21 NOTE — TELEPHONE ENCOUNTER
With type 2 diabetes we like to do office visits along with blood work every 6 months. The last time you were here was September 1. That means that you are due for a visit in March.

## 2022-02-21 NOTE — TELEPHONE ENCOUNTER
From: Maya Adler  To: Alec De León MD  Sent: 2/20/2022 6:30 PM CST  Subject: Test    I received a letter that said I needed to have some labs done. Is this correct?  I just had them done in August.

## 2022-03-02 RX ORDER — TESTOSTERONE GEL, 1% 10 MG/G
GEL TRANSDERMAL
Qty: 150 G | Refills: 5 | Status: SHIPPED | OUTPATIENT
Start: 2022-03-02

## 2022-03-02 RX ORDER — TADALAFIL 20 MG/1
TABLET ORAL
Qty: 24 TABLET | Refills: 1 | Status: SHIPPED | OUTPATIENT
Start: 2022-03-02

## 2022-03-02 NOTE — TELEPHONE ENCOUNTER
Tadalafil/Testosterone: 9/11/21    Future appt: Your appointments     Date & Time Appointment Department [facility])    Mar 07, 2022  9:00 AM CST Laboratory Visit with [facility] [place] ([facility])        Mar 10, 2022  2:30 PM CST Follow up - Extended with Karlos Pacheco, [address] ,  [place] Merino, [facility] ([facility])        Sep 07, 2022  1:30 PM CDT Physical - Established with Karthik Calloway MD [address] [facility] [place] [place])            [address] [facility] [place]  [address] [facility] [id number]  [facility]  ED[facility] Ref Lab [place]  [phone] [id number]  [phone]        Last Appointment with provider:   Visit date not found  Last appointment at [facility] [place]:  12/1/2021  Cholesterol, Total (mg/dL)   Date Value   08/24/2021 177     HDL Cholesterol (mg/dL)   Date Value   08/24/2021 61 (H)     LDL Cholesterol (mg/dL)   Date Value   08/24/2021 99     Triglycerides (mg/dL)   Date Value   08/24/2021 96     Lab Results   Component Value Date     (H) 08/24/2021    A1C 6.6 (H) 08/24/2021     Lab Results   Component Value Date    TSH 1.160 08/24/2021       No follow-ups on file.

## 2022-03-07 ENCOUNTER — LAB ENCOUNTER (OUTPATIENT)
Dept: LAB | Age: 62
End: 2022-03-07
Attending: FAMILY MEDICINE
Payer: COMMERCIAL

## 2022-03-07 DIAGNOSIS — E11.42 CONTROLLED TYPE 2 DIABETES MELLITUS WITH DIABETIC POLYNEUROPATHY, WITHOUT LONG-TERM CURRENT USE OF INSULIN (HCC): ICD-10-CM

## 2022-03-07 LAB
ALBUMIN SERPL-MCNC: 3.7 G/DL (ref 3.4–5)
ALBUMIN/GLOB SERPL: 1.1 {RATIO} (ref 1–2)
ALP LIVER SERPL-CCNC: 63 U/L
ALT SERPL-CCNC: 29 U/L
ANION GAP SERPL CALC-SCNC: 5 MMOL/L (ref 0–18)
AST SERPL-CCNC: 18 U/L (ref 15–37)
BILIRUB SERPL-MCNC: 0.6 MG/DL (ref 0.1–2)
BUN BLD-MCNC: 16 MG/DL (ref 7–18)
CALCIUM BLD-MCNC: 9.8 MG/DL (ref 8.5–10.1)
CHLORIDE SERPL-SCNC: 106 MMOL/L (ref 98–112)
CO2 SERPL-SCNC: 29 MMOL/L (ref 21–32)
CREAT BLD-MCNC: 1 MG/DL
EST. AVERAGE GLUCOSE BLD GHB EST-MCNC: 140 MG/DL (ref 68–126)
FASTING STATUS PATIENT QL REPORTED: NO
GLOBULIN PLAS-MCNC: 3.4 G/DL (ref 2.8–4.4)
GLUCOSE BLD-MCNC: 124 MG/DL (ref 70–99)
HBA1C MFR BLD: 6.5 % (ref ?–5.7)
OSMOLALITY SERPL CALC.SUM OF ELEC: 293 MOSM/KG (ref 275–295)
POTASSIUM SERPL-SCNC: 4 MMOL/L (ref 3.5–5.1)
PROT SERPL-MCNC: 7.1 G/DL (ref 6.4–8.2)
SODIUM SERPL-SCNC: 140 MMOL/L (ref 136–145)

## 2022-03-07 PROCEDURE — 80053 COMPREHEN METABOLIC PANEL: CPT | Performed by: FAMILY MEDICINE

## 2022-03-07 PROCEDURE — 83036 HEMOGLOBIN GLYCOSYLATED A1C: CPT | Performed by: FAMILY MEDICINE

## 2022-03-07 PROCEDURE — 3044F HG A1C LEVEL LT 7.0%: CPT | Performed by: NURSE PRACTITIONER

## 2022-03-10 ENCOUNTER — PATIENT OUTREACH (OUTPATIENT)
Dept: FAMILY MEDICINE CLINIC | Facility: CLINIC | Age: 62
End: 2022-03-10

## 2022-03-10 ENCOUNTER — OFFICE VISIT (OUTPATIENT)
Dept: FAMILY MEDICINE CLINIC | Facility: CLINIC | Age: 62
End: 2022-03-10
Payer: COMMERCIAL

## 2022-03-10 VITALS
SYSTOLIC BLOOD PRESSURE: 126 MMHG | HEIGHT: 68 IN | WEIGHT: 249.63 LBS | RESPIRATION RATE: 20 BRPM | DIASTOLIC BLOOD PRESSURE: 74 MMHG | BODY MASS INDEX: 37.83 KG/M2 | OXYGEN SATURATION: 96 % | TEMPERATURE: 98 F | HEART RATE: 90 BPM

## 2022-03-10 DIAGNOSIS — E11.9 CONTROLLED TYPE 2 DIABETES MELLITUS WITHOUT COMPLICATION, WITHOUT LONG-TERM CURRENT USE OF INSULIN (HCC): ICD-10-CM

## 2022-03-10 DIAGNOSIS — I10 BENIGN ESSENTIAL HYPERTENSION: Primary | ICD-10-CM

## 2022-03-10 PROCEDURE — 3078F DIAST BP <80 MM HG: CPT | Performed by: NURSE PRACTITIONER

## 2022-03-10 PROCEDURE — 3074F SYST BP LT 130 MM HG: CPT | Performed by: NURSE PRACTITIONER

## 2022-03-10 PROCEDURE — 99214 OFFICE O/P EST MOD 30 MIN: CPT | Performed by: NURSE PRACTITIONER

## 2022-03-10 PROCEDURE — 3008F BODY MASS INDEX DOCD: CPT | Performed by: NURSE PRACTITIONER

## 2022-03-10 NOTE — PATIENT INSTRUCTIONS
Good exam today  Continue to stay active  Your blood pressure is stable and A1C improved  Stay active, get back to biking when it gets warmer  DASH and Mediterranean diet for overall heart healthy and low sodium food choices/changes  Follow up with Dr. Agustina Oliveira in the fall for your annual physical

## 2022-03-28 RX ORDER — AMLODIPINE BESYLATE 10 MG/1
TABLET ORAL
Qty: 90 TABLET | Refills: 1 | Status: SHIPPED | OUTPATIENT
Start: 2022-03-28

## 2022-03-28 RX ORDER — OMEPRAZOLE 20 MG/1
CAPSULE, DELAYED RELEASE ORAL
Qty: 90 CAPSULE | Refills: 1 | Status: SHIPPED | OUTPATIENT
Start: 2022-03-28

## 2022-03-28 NOTE — TELEPHONE ENCOUNTER
Metformin/Amlodipine/Omeprazaol: 10/13/21    Future appt: Your appointments     Date & Time Appointment Department [facility])    Sep 07, 2022  1:30 PM CDT Physical - Established with Kimberli Mckeon MD [address] [facility] ([place])            [address] [facility] [phone]  [phone]        Last Appointment with provider:   Visit date not found  [facility] [place]:  3/10/2022  Ingrid/APRN  HTN      Cholesterol, Total (mg/dL)   Date Value   08/24/2021 177     HDL Cholesterol (mg/dL)   Date Value   08/24/2021 61 (H)     LDL Cholesterol (mg/dL)   Date Value   08/24/2021 99     Triglycerides (mg/dL)   Date Value   08/24/2021 96     Lab Results   Component Value Date     (H) 03/07/2022    A1C 6.5 (H) 03/07/2022     Lab Results   Component Value Date    TSH 1.160 08/24/2021       No follow-ups on file.

## 2022-04-25 RX ORDER — TADALAFIL 20 MG/1
TABLET ORAL
Qty: 24 TABLET | Refills: 1 | Status: SHIPPED | OUTPATIENT
Start: 2022-04-25

## 2022-04-25 NOTE — TELEPHONE ENCOUNTER
Tadalafil:     Future appt: Your appointments     Date & Time Appointment Department [facility])    Sep 07, 2022  1:30 PM CDT Physical - Established with Luh Thomason MD 25 [facility] ([facility])            [address] [facility]  [facility] [phone]  [phone]        Last Appointment with provider:   Visit date not found  [facility] [place]:  3/10/2022  Cholesterol, Total (mg/dL)   Date Value   08/24/2021 177     HDL Cholesterol (mg/dL)   Date Value   08/24/2021 61 (H)     LDL Cholesterol (mg/dL)   Date Value   08/24/2021 99     Triglycerides (mg/dL)   Date Value   08/24/2021 96     Lab Results   Component Value Date     (H) 03/07/2022    A1C 6.5 (H) 03/07/2022     Lab Results   Component Value Date    TSH 1.160 08/24/2021       No follow-ups on file.

## 2022-05-21 DIAGNOSIS — I10 BENIGN ESSENTIAL HYPERTENSION: ICD-10-CM

## 2022-05-21 DIAGNOSIS — E11.9 CONTROLLED TYPE 2 DIABETES MELLITUS WITHOUT COMPLICATION, WITHOUT LONG-TERM CURRENT USE OF INSULIN (HCC): ICD-10-CM

## 2022-05-23 RX ORDER — AMLODIPINE BESYLATE 10 MG/1
TABLET ORAL
Qty: 90 TABLET | Refills: 1 | Status: SHIPPED | OUTPATIENT
Start: 2022-05-23

## 2022-05-23 RX ORDER — TADALAFIL 20 MG/1
TABLET ORAL
Qty: 24 TABLET | Refills: 1 | Status: SHIPPED | OUTPATIENT
Start: 2022-05-23

## 2022-05-23 RX ORDER — HYDROCHLOROTHIAZIDE 12.5 MG/1
TABLET ORAL
Qty: 90 TABLET | Refills: 1 | Status: SHIPPED | OUTPATIENT
Start: 2022-05-23

## 2022-05-23 NOTE — TELEPHONE ENCOUNTER
Metformin: 3/28/22  Amlodipine: 3/28/22  Hydrochlorothiazide: 12/6/21  Tadalafil:     Future appt: Your appointments     Date & Time Appointment Department [facility])    Sep 07, 2022  1:30 PM CDT Physical - Established with Rafael Deshpande MD 25 [facility], [place] ([facility])            [address] [facility]  [facility] [address] [phone]  [phone]        Last Appointment with provider:   Visit date not found  Last appointment at [facility] [place]:  3/10/2022  Cholesterol, Total (mg/dL)   Date Value   08/24/2021 177     HDL Cholesterol (mg/dL)   Date Value   08/24/2021 61 (H)     LDL Cholesterol (mg/dL)   Date Value   08/24/2021 99     Triglycerides (mg/dL)   Date Value   08/24/2021 96     Lab Results   Component Value Date     (H) 03/07/2022    A1C 6.5 (H) 03/07/2022     Lab Results   Component Value Date    TSH 1.160 08/24/2021       No follow-ups on file.

## 2022-05-24 ENCOUNTER — OFFICE VISIT (OUTPATIENT)
Dept: FAMILY MEDICINE CLINIC | Facility: CLINIC | Age: 62
End: 2022-05-24
Payer: COMMERCIAL

## 2022-05-24 VITALS
SYSTOLIC BLOOD PRESSURE: 136 MMHG | WEIGHT: 250.19 LBS | TEMPERATURE: 97 F | BODY MASS INDEX: 37.92 KG/M2 | HEART RATE: 75 BPM | OXYGEN SATURATION: 96 % | DIASTOLIC BLOOD PRESSURE: 86 MMHG | RESPIRATION RATE: 18 BRPM | HEIGHT: 68 IN

## 2022-05-24 DIAGNOSIS — M25.552 BILATERAL HIP PAIN: ICD-10-CM

## 2022-05-24 DIAGNOSIS — M54.50 LOW BACK PAIN WITH RADIATION: Primary | ICD-10-CM

## 2022-05-24 DIAGNOSIS — M25.551 BILATERAL HIP PAIN: ICD-10-CM

## 2022-05-24 PROCEDURE — 3075F SYST BP GE 130 - 139MM HG: CPT | Performed by: NURSE PRACTITIONER

## 2022-05-24 PROCEDURE — 3008F BODY MASS INDEX DOCD: CPT | Performed by: NURSE PRACTITIONER

## 2022-05-24 PROCEDURE — 99214 OFFICE O/P EST MOD 30 MIN: CPT | Performed by: NURSE PRACTITIONER

## 2022-05-24 PROCEDURE — 3079F DIAST BP 80-89 MM HG: CPT | Performed by: NURSE PRACTITIONER

## 2022-05-24 RX ORDER — METHYLPREDNISOLONE 4 MG/1
TABLET ORAL
Qty: 21 EACH | Refills: 0 | Status: SHIPPED | OUTPATIENT
Start: 2022-05-24

## 2022-05-24 RX ORDER — METAXALONE 800 MG/1
400 TABLET ORAL NIGHTLY PRN
Qty: 10 TABLET | Refills: 0 | Status: SHIPPED | OUTPATIENT
Start: 2022-05-24

## 2022-05-24 NOTE — PATIENT INSTRUCTIONS
Medrol pack, take as prescribed, take with food; may bring you glucose levels up temporarily  Muscle relaxer half a tablet nightly if needed for pain; can make you drowsy, ensure you're wearing your CPAP; side effects reviewed  No advil/aleve/ibuprofe/motrin while on the Medrol pack  Notify Dr. Fenton Meter office regarding symptoms as they performed surgery a little more than 2 years ago

## 2022-06-24 DIAGNOSIS — E78.49 FAMILIAL MULTIPLE LIPOPROTEIN-TYPE HYPERLIPIDEMIA: ICD-10-CM

## 2022-06-24 RX ORDER — FLUTICASONE PROPIONATE 50 MCG
SPRAY, SUSPENSION (ML) NASAL
Qty: 48 G | Refills: 1 | Status: SHIPPED | OUTPATIENT
Start: 2022-06-24

## 2022-06-24 RX ORDER — SIMVASTATIN 40 MG
TABLET ORAL
Qty: 90 TABLET | Refills: 1 | Status: SHIPPED | OUTPATIENT
Start: 2022-06-24

## 2022-06-24 RX ORDER — TAMSULOSIN HYDROCHLORIDE 0.4 MG/1
CAPSULE ORAL
Qty: 90 CAPSULE | Refills: 1 | Status: SHIPPED | OUTPATIENT
Start: 2022-06-24

## 2022-06-24 RX ORDER — HYDROCHLOROTHIAZIDE 12.5 MG/1
TABLET ORAL
Qty: 90 TABLET | Refills: 1 | Status: SHIPPED | OUTPATIENT
Start: 2022-06-24

## 2022-06-24 RX ORDER — TADALAFIL 20 MG/1
TABLET ORAL
Qty: 24 TABLET | Refills: 1 | Status: SHIPPED | OUTPATIENT
Start: 2022-06-24

## 2022-06-24 NOTE — TELEPHONE ENCOUNTER
Hydrochlorothiazide: 5/23/22  Fluticasone: 11/12/21  Simvastatin/ Tamsulosin: 12/6/21  Tadalafil: 5/23/22  Future appt: Your appointments     Date & Time Appointment Department [facility])    Sep 07, 2022  1:30 PM CDT Physical - Established with Cherie Reynaga MD [facility])            25 [facility] [phone]  [phone]        Last Appointment with provider:   Visit date not found  Last appointment at [facility]:  5/24/2022  Cholesterol, Total (mg/dL)   Date Value   08/24/2021 177     HDL Cholesterol (mg/dL)   Date Value   08/24/2021 61 (H)     LDL Cholesterol (mg/dL)   Date Value   08/24/2021 99     Triglycerides (mg/dL)   Date Value   08/24/2021 96     Lab Results   Component Value Date     (H) 03/07/2022    A1C 6.5 (H) 03/07/2022     Lab Results   Component Value Date    TSH 1.160 08/24/2021       No follow-ups on file.

## 2022-06-28 ENCOUNTER — OFFICE VISIT (OUTPATIENT)
Dept: FAMILY MEDICINE CLINIC | Facility: CLINIC | Age: 62
End: 2022-06-28
Payer: COMMERCIAL

## 2022-06-28 VITALS
HEART RATE: 85 BPM | RESPIRATION RATE: 20 BRPM | SYSTOLIC BLOOD PRESSURE: 122 MMHG | BODY MASS INDEX: 38.37 KG/M2 | OXYGEN SATURATION: 95 % | HEIGHT: 68 IN | TEMPERATURE: 98 F | DIASTOLIC BLOOD PRESSURE: 82 MMHG | WEIGHT: 253.19 LBS

## 2022-06-28 DIAGNOSIS — S29.011A MUSCLE STRAIN OF CHEST WALL, INITIAL ENCOUNTER: Primary | ICD-10-CM

## 2022-06-28 PROCEDURE — 3008F BODY MASS INDEX DOCD: CPT | Performed by: NURSE PRACTITIONER

## 2022-06-28 PROCEDURE — 99213 OFFICE O/P EST LOW 20 MIN: CPT | Performed by: NURSE PRACTITIONER

## 2022-06-28 PROCEDURE — 3079F DIAST BP 80-89 MM HG: CPT | Performed by: NURSE PRACTITIONER

## 2022-06-28 PROCEDURE — 3074F SYST BP LT 130 MM HG: CPT | Performed by: NURSE PRACTITIONER

## 2022-06-28 RX ORDER — MELOXICAM 7.5 MG/1
7.5 TABLET ORAL DAILY
Qty: 7 TABLET | Refills: 0 | Status: SHIPPED | OUTPATIENT
Start: 2022-06-28

## 2022-07-26 DIAGNOSIS — E79.0 HYPERURICEMIA: ICD-10-CM

## 2022-07-27 RX ORDER — TAMSULOSIN HYDROCHLORIDE 0.4 MG/1
CAPSULE ORAL
Qty: 90 CAPSULE | Refills: 1 | Status: SHIPPED | OUTPATIENT
Start: 2022-07-27

## 2022-07-27 RX ORDER — FLUTICASONE PROPIONATE 50 MCG
SPRAY, SUSPENSION (ML) NASAL
Qty: 48 G | Refills: 1 | Status: SHIPPED | OUTPATIENT
Start: 2022-07-27

## 2022-07-27 RX ORDER — METOPROLOL SUCCINATE 50 MG/1
TABLET, EXTENDED RELEASE ORAL
Qty: 90 TABLET | Refills: 1 | Status: SHIPPED | OUTPATIENT
Start: 2022-07-27

## 2022-07-27 RX ORDER — ALLOPURINOL 300 MG/1
TABLET ORAL
Qty: 90 TABLET | Refills: 1 | Status: SHIPPED | OUTPATIENT
Start: 2022-07-27

## 2022-07-27 NOTE — TELEPHONE ENCOUNTER
Tamsulosin/Flonase:  6/24/22  Allopuriinol: 12/6/21  Metoprolol: 2/7/22    Future appt: Your appointments     Date & Time Appointment Department [facility])    Sep 07, 2022  1:30 PM CDT Physical - Established with Zeus Ervin MD 25 [facility])            25 [facility] [address] [phone]  [phone]        Last Appointment with provider:   Visit date not found  [facility] appointment [facility]:  6/28/2022  Ingrid/APRN  Cholesterol, Total (mg/dL)   Date Value   08/24/2021 177     HDL Cholesterol (mg/dL)   Date Value   08/24/2021 61 (H)     LDL Cholesterol (mg/dL)   Date Value   08/24/2021 99     Triglycerides (mg/dL)   Date Value   08/24/2021 96     Lab Results   Component Value Date     (H) 03/07/2022    A1C 6.5 (H) 03/07/2022     Lab Results   Component Value Date    TSH 1.160 08/24/2021       No follow-ups on file.

## 2022-08-23 ENCOUNTER — TELEPHONE (OUTPATIENT)
Dept: FAMILY MEDICINE CLINIC | Facility: CLINIC | Age: 62
End: 2022-08-23

## 2022-08-23 DIAGNOSIS — E11.9 CONTROLLED TYPE 2 DIABETES MELLITUS WITHOUT COMPLICATION, WITHOUT LONG-TERM CURRENT USE OF INSULIN (HCC): ICD-10-CM

## 2022-08-23 DIAGNOSIS — E78.49 FAMILIAL MULTIPLE LIPOPROTEIN-TYPE HYPERLIPIDEMIA: ICD-10-CM

## 2022-08-23 DIAGNOSIS — E79.0 HYPERURICEMIA: Primary | ICD-10-CM

## 2022-08-23 DIAGNOSIS — E29.1 MALE HYPOGONADISM: ICD-10-CM

## 2022-08-23 DIAGNOSIS — I10 BENIGN ESSENTIAL HYPERTENSION: ICD-10-CM

## 2022-08-23 NOTE — TELEPHONE ENCOUNTER
Patient has made lab appt for tomorrow in hopes of getting his annual labs done for upcoming appt, and also wants to check testosterone

## 2022-08-29 RX ORDER — TESTOSTERONE GEL, 1% 10 MG/G
GEL TRANSDERMAL
Qty: 150 G | Refills: 5 | Status: SHIPPED | OUTPATIENT
Start: 2022-08-29

## 2022-08-29 RX ORDER — TADALAFIL 20 MG/1
TABLET ORAL
Qty: 24 TABLET | Refills: 1 | Status: SHIPPED | OUTPATIENT
Start: 2022-08-29

## 2022-08-29 NOTE — TELEPHONE ENCOUNTER
Testosterone:  3/2/22  Tadalafil: 6/24/22    Future appt: Your appointments     Date & Time Appointment Department [facility])    Aug 31, 2022  9:30 AM CDT Laboratory Visit with [facility] ([facility])        Sep 07, 2022  1:30 PM CDT Physical - Established with Gayle Pemberton MD [address] [facility])            25 [facility]  [place] [phone]  [address] [facility]  [facility] [place]  [id number]  [phone]        Last Appointment with provider:   Visit date not found  Last appointment at [facility]:  6/28/2022  [facility] APRN/ Diabetes  Cholesterol, Total (mg/dL)   Date Value   08/24/2021 177     HDL Cholesterol (mg/dL)   Date Value   08/24/2021 61 (H)     LDL Cholesterol (mg/dL)   Date Value   08/24/2021 99     Triglycerides (mg/dL)   Date Value   08/24/2021 96     Lab Results   Component Value Date     (H) 03/07/2022    A1C 6.5 (H) 03/07/2022     Lab Results   Component Value Date    TSH 1.160 08/24/2021       No follow-ups on file.

## 2022-09-06 ENCOUNTER — LABORATORY ENCOUNTER (OUTPATIENT)
Dept: LAB | Age: 62
End: 2022-09-06
Attending: FAMILY MEDICINE
Payer: COMMERCIAL

## 2022-09-06 DIAGNOSIS — E11.9 CONTROLLED TYPE 2 DIABETES MELLITUS WITHOUT COMPLICATION, WITHOUT LONG-TERM CURRENT USE OF INSULIN (HCC): ICD-10-CM

## 2022-09-06 DIAGNOSIS — I10 BENIGN ESSENTIAL HYPERTENSION: ICD-10-CM

## 2022-09-06 DIAGNOSIS — E78.49 FAMILIAL MULTIPLE LIPOPROTEIN-TYPE HYPERLIPIDEMIA: ICD-10-CM

## 2022-09-06 DIAGNOSIS — E29.1 MALE HYPOGONADISM: ICD-10-CM

## 2022-09-06 DIAGNOSIS — E79.0 HYPERURICEMIA: ICD-10-CM

## 2022-09-06 LAB
ALBUMIN SERPL-MCNC: 3.6 G/DL (ref 3.4–5)
ALBUMIN/GLOB SERPL: 1 {RATIO} (ref 1–2)
ALP LIVER SERPL-CCNC: 62 U/L
ALT SERPL-CCNC: 31 U/L
ANION GAP SERPL CALC-SCNC: 7 MMOL/L (ref 0–18)
AST SERPL-CCNC: 21 U/L (ref 15–37)
BASOPHILS # BLD AUTO: 0.03 X10(3) UL (ref 0–0.2)
BASOPHILS NFR BLD AUTO: 0.7 %
BILIRUB SERPL-MCNC: 0.5 MG/DL (ref 0.1–2)
BUN BLD-MCNC: 16 MG/DL (ref 7–18)
CALCIUM BLD-MCNC: 9.1 MG/DL (ref 8.5–10.1)
CHLORIDE SERPL-SCNC: 106 MMOL/L (ref 98–112)
CHOLEST SERPL-MCNC: 178 MG/DL (ref ?–200)
CO2 SERPL-SCNC: 26 MMOL/L (ref 21–32)
COMPLEXED PSA SERPL-MCNC: 0.61 NG/ML (ref ?–4)
CREAT BLD-MCNC: 1.08 MG/DL
CREAT UR-SCNC: 102 MG/DL
EOSINOPHIL # BLD AUTO: 0.17 X10(3) UL (ref 0–0.7)
EOSINOPHIL NFR BLD AUTO: 3.9 %
ERYTHROCYTE [DISTWIDTH] IN BLOOD BY AUTOMATED COUNT: 12.7 %
EST. AVERAGE GLUCOSE BLD GHB EST-MCNC: 137 MG/DL (ref 68–126)
FASTING PATIENT LIPID ANSWER: NO
FASTING STATUS PATIENT QL REPORTED: NO
GFR SERPLBLD BASED ON 1.73 SQ M-ARVRAT: 78 ML/MIN/1.73M2 (ref 60–?)
GLOBULIN PLAS-MCNC: 3.5 G/DL (ref 2.8–4.4)
GLUCOSE BLD-MCNC: 123 MG/DL (ref 70–99)
HBA1C MFR BLD: 6.4 % (ref ?–5.7)
HCT VFR BLD AUTO: 46 %
HDLC SERPL-MCNC: 72 MG/DL (ref 40–59)
HGB BLD-MCNC: 14.7 G/DL
IMM GRANULOCYTES # BLD AUTO: 0.01 X10(3) UL (ref 0–1)
IMM GRANULOCYTES NFR BLD: 0.2 %
LDLC SERPL CALC-MCNC: 77 MG/DL (ref ?–100)
LYMPHOCYTES # BLD AUTO: 1.15 X10(3) UL (ref 1–4)
LYMPHOCYTES NFR BLD AUTO: 26.4 %
MCH RBC QN AUTO: 31 PG (ref 26–34)
MCHC RBC AUTO-ENTMCNC: 32 G/DL (ref 31–37)
MCV RBC AUTO: 97 FL
MICROALBUMIN UR-MCNC: 0.79 MG/DL
MICROALBUMIN/CREAT 24H UR-RTO: 7.7 UG/MG (ref ?–30)
MONOCYTES # BLD AUTO: 0.39 X10(3) UL (ref 0.1–1)
MONOCYTES NFR BLD AUTO: 9 %
NEUTROPHILS # BLD AUTO: 2.6 X10 (3) UL (ref 1.5–7.7)
NEUTROPHILS # BLD AUTO: 2.6 X10(3) UL (ref 1.5–7.7)
NEUTROPHILS NFR BLD AUTO: 59.8 %
NONHDLC SERPL-MCNC: 106 MG/DL (ref ?–130)
OSMOLALITY SERPL CALC.SUM OF ELEC: 291 MOSM/KG (ref 275–295)
PLATELET # BLD AUTO: 211 10(3)UL (ref 150–450)
POTASSIUM SERPL-SCNC: 3.7 MMOL/L (ref 3.5–5.1)
PROT SERPL-MCNC: 7.1 G/DL (ref 6.4–8.2)
RBC # BLD AUTO: 4.74 X10(6)UL
SODIUM SERPL-SCNC: 139 MMOL/L (ref 136–145)
TESTOST SERPL-MCNC: 816.56 NG/DL
TRIGL SERPL-MCNC: 175 MG/DL (ref 30–149)
TSI SER-ACNC: 1.28 MIU/ML (ref 0.36–3.74)
URATE SERPL-MCNC: 3.7 MG/DL
VLDLC SERPL CALC-MCNC: 27 MG/DL (ref 0–30)
WBC # BLD AUTO: 4.4 X10(3) UL (ref 4–11)

## 2022-09-06 PROCEDURE — 80050 GENERAL HEALTH PANEL: CPT | Performed by: FAMILY MEDICINE

## 2022-09-06 PROCEDURE — 3061F NEG MICROALBUMINURIA REV: CPT | Performed by: FAMILY MEDICINE

## 2022-09-06 PROCEDURE — 80061 LIPID PANEL: CPT | Performed by: FAMILY MEDICINE

## 2022-09-06 PROCEDURE — 3044F HG A1C LEVEL LT 7.0%: CPT | Performed by: FAMILY MEDICINE

## 2022-09-06 PROCEDURE — 84403 ASSAY OF TOTAL TESTOSTERONE: CPT | Performed by: FAMILY MEDICINE

## 2022-09-06 PROCEDURE — 83036 HEMOGLOBIN GLYCOSYLATED A1C: CPT | Performed by: FAMILY MEDICINE

## 2022-09-06 PROCEDURE — 84153 ASSAY OF PSA TOTAL: CPT | Performed by: FAMILY MEDICINE

## 2022-09-06 PROCEDURE — 82043 UR ALBUMIN QUANTITATIVE: CPT | Performed by: FAMILY MEDICINE

## 2022-09-06 PROCEDURE — 82570 ASSAY OF URINE CREATININE: CPT | Performed by: FAMILY MEDICINE

## 2022-09-06 PROCEDURE — 84550 ASSAY OF BLOOD/URIC ACID: CPT | Performed by: FAMILY MEDICINE

## 2022-09-07 ENCOUNTER — OFFICE VISIT (OUTPATIENT)
Dept: FAMILY MEDICINE CLINIC | Facility: CLINIC | Age: 62
End: 2022-09-07
Payer: COMMERCIAL

## 2022-09-07 VITALS
WEIGHT: 248 LBS | HEIGHT: 68 IN | SYSTOLIC BLOOD PRESSURE: 140 MMHG | BODY MASS INDEX: 37.59 KG/M2 | DIASTOLIC BLOOD PRESSURE: 82 MMHG | TEMPERATURE: 98 F | HEART RATE: 96 BPM | RESPIRATION RATE: 18 BRPM | OXYGEN SATURATION: 90 %

## 2022-09-07 DIAGNOSIS — E79.0 HYPERURICEMIA: ICD-10-CM

## 2022-09-07 DIAGNOSIS — M16.0 PRIMARY OSTEOARTHRITIS OF BOTH HIPS: ICD-10-CM

## 2022-09-07 DIAGNOSIS — E78.49 FAMILIAL MULTIPLE LIPOPROTEIN-TYPE HYPERLIPIDEMIA: ICD-10-CM

## 2022-09-07 DIAGNOSIS — G47.33 OBSTRUCTIVE SLEEP APNEA: ICD-10-CM

## 2022-09-07 DIAGNOSIS — L57.0 ACTINIC KERATOSIS: ICD-10-CM

## 2022-09-07 DIAGNOSIS — Z00.01 ENCOUNTER FOR GENERAL ADULT MEDICAL EXAMINATION WITH ABNORMAL FINDINGS: Primary | ICD-10-CM

## 2022-09-07 DIAGNOSIS — M15.9 PRIMARY OSTEOARTHRITIS INVOLVING MULTIPLE JOINTS: ICD-10-CM

## 2022-09-07 DIAGNOSIS — Z98.52 S/P VASECTOMY: ICD-10-CM

## 2022-09-07 DIAGNOSIS — Z86.16 HISTORY OF COVID-19: ICD-10-CM

## 2022-09-07 DIAGNOSIS — G62.9 NEUROPATHY: ICD-10-CM

## 2022-09-07 DIAGNOSIS — E29.1 MALE HYPOGONADISM: ICD-10-CM

## 2022-09-07 DIAGNOSIS — N52.03 COMBINED ARTERIAL INSUFFICIENCY AND CORPORO-VENOUS OCCLUSIVE ERECTILE DYSFUNCTION: ICD-10-CM

## 2022-09-07 DIAGNOSIS — K21.9 GASTROESOPHAGEAL REFLUX DISEASE WITHOUT ESOPHAGITIS: ICD-10-CM

## 2022-09-07 DIAGNOSIS — E11.9 CONTROLLED TYPE 2 DIABETES MELLITUS WITHOUT COMPLICATION, WITHOUT LONG-TERM CURRENT USE OF INSULIN (HCC): ICD-10-CM

## 2022-09-07 DIAGNOSIS — E11.42 CONTROLLED TYPE 2 DIABETES MELLITUS WITH DIABETIC POLYNEUROPATHY, WITHOUT LONG-TERM CURRENT USE OF INSULIN (HCC): ICD-10-CM

## 2022-09-07 DIAGNOSIS — I10 BENIGN ESSENTIAL HYPERTENSION: ICD-10-CM

## 2022-09-07 PROBLEM — M25.551 BILATERAL HIP PAIN: Status: RESOLVED | Noted: 2022-06-13 | Resolved: 2022-09-07

## 2022-09-07 PROBLEM — H61.21 HEARING LOSS OF RIGHT EAR DUE TO CERUMEN IMPACTION: Status: RESOLVED | Noted: 2021-11-08 | Resolved: 2022-09-07

## 2022-09-07 PROBLEM — M25.551 BILATERAL HIP PAIN: Status: ACTIVE | Noted: 2022-06-13

## 2022-09-07 PROBLEM — M25.552 BILATERAL HIP PAIN: Status: ACTIVE | Noted: 2022-06-13

## 2022-09-07 PROBLEM — M25.552 BILATERAL HIP PAIN: Status: RESOLVED | Noted: 2022-06-13 | Resolved: 2022-09-07

## 2022-09-07 PROCEDURE — 3079F DIAST BP 80-89 MM HG: CPT | Performed by: FAMILY MEDICINE

## 2022-09-07 PROCEDURE — 3008F BODY MASS INDEX DOCD: CPT | Performed by: FAMILY MEDICINE

## 2022-09-07 PROCEDURE — 3077F SYST BP >= 140 MM HG: CPT | Performed by: FAMILY MEDICINE

## 2022-09-07 PROCEDURE — 99396 PREV VISIT EST AGE 40-64: CPT | Performed by: FAMILY MEDICINE

## 2022-09-07 PROCEDURE — 99213 OFFICE O/P EST LOW 20 MIN: CPT | Performed by: FAMILY MEDICINE

## 2022-09-07 RX ORDER — ALLOPURINOL 300 MG/1
300 TABLET ORAL DAILY
Qty: 90 TABLET | Refills: 3 | Status: SHIPPED | OUTPATIENT
Start: 2022-09-07

## 2022-09-07 RX ORDER — HYDROCHLOROTHIAZIDE 12.5 MG/1
12.5 TABLET ORAL DAILY
Qty: 90 TABLET | Refills: 3 | Status: SHIPPED | OUTPATIENT
Start: 2022-09-07

## 2022-09-07 RX ORDER — METOPROLOL SUCCINATE 50 MG/1
50 TABLET, EXTENDED RELEASE ORAL DAILY
Qty: 90 TABLET | Refills: 3 | Status: SHIPPED | OUTPATIENT
Start: 2022-09-07

## 2022-09-07 RX ORDER — TADALAFIL 20 MG/1
20 TABLET ORAL AS DIRECTED
Qty: 24 TABLET | Refills: 3 | Status: SHIPPED | OUTPATIENT
Start: 2022-09-07

## 2022-09-07 RX ORDER — FLUTICASONE PROPIONATE 50 MCG
1 SPRAY, SUSPENSION (ML) NASAL 2 TIMES DAILY
Qty: 48 G | Refills: 3 | Status: SHIPPED | OUTPATIENT
Start: 2022-09-07

## 2022-09-07 RX ORDER — OMEPRAZOLE 20 MG/1
20 CAPSULE, DELAYED RELEASE ORAL
Qty: 90 CAPSULE | Refills: 3 | Status: SHIPPED | OUTPATIENT
Start: 2022-09-07

## 2022-09-07 RX ORDER — TAMSULOSIN HYDROCHLORIDE 0.4 MG/1
0.4 CAPSULE ORAL DAILY
Qty: 90 CAPSULE | Refills: 3 | Status: SHIPPED | OUTPATIENT
Start: 2022-09-07

## 2022-09-07 RX ORDER — SIMVASTATIN 40 MG
40 TABLET ORAL NIGHTLY
Qty: 90 TABLET | Refills: 3 | Status: SHIPPED | OUTPATIENT
Start: 2022-09-07

## 2022-09-07 RX ORDER — AMLODIPINE BESYLATE 10 MG/1
10 TABLET ORAL DAILY
Qty: 90 TABLET | Refills: 3 | Status: SHIPPED | OUTPATIENT
Start: 2022-09-07

## 2022-09-07 RX ORDER — TESTOSTERONE GEL, 1% 10 MG/G
1 GEL TRANSDERMAL DAILY
Qty: 150 G | Refills: 5 | Status: SHIPPED | OUTPATIENT
Start: 2022-09-07

## 2022-09-27 ENCOUNTER — TELEPHONE (OUTPATIENT)
Dept: FAMILY MEDICINE CLINIC | Facility: CLINIC | Age: 62
End: 2022-09-27

## 2022-10-11 ENCOUNTER — OFFICE VISIT (OUTPATIENT)
Dept: FAMILY MEDICINE CLINIC | Facility: CLINIC | Age: 62
End: 2022-10-11
Payer: COMMERCIAL

## 2022-10-11 VITALS
TEMPERATURE: 97 F | HEIGHT: 68 IN | SYSTOLIC BLOOD PRESSURE: 124 MMHG | DIASTOLIC BLOOD PRESSURE: 80 MMHG | RESPIRATION RATE: 18 BRPM | HEART RATE: 80 BPM | WEIGHT: 246.38 LBS | OXYGEN SATURATION: 96 % | BODY MASS INDEX: 37.34 KG/M2

## 2022-10-11 DIAGNOSIS — H10.31 ACUTE BACTERIAL CONJUNCTIVITIS OF RIGHT EYE: Primary | ICD-10-CM

## 2022-10-11 DIAGNOSIS — H00.022 HORDEOLUM INTERNUM OF RIGHT LOWER EYELID: ICD-10-CM

## 2022-10-11 PROCEDURE — 3079F DIAST BP 80-89 MM HG: CPT

## 2022-10-11 PROCEDURE — 99213 OFFICE O/P EST LOW 20 MIN: CPT

## 2022-10-11 PROCEDURE — 3074F SYST BP LT 130 MM HG: CPT

## 2022-10-11 PROCEDURE — 3008F BODY MASS INDEX DOCD: CPT

## 2022-10-11 RX ORDER — CEPHALEXIN 500 MG/1
500 CAPSULE ORAL 3 TIMES DAILY
Qty: 30 CAPSULE | Refills: 0 | Status: SHIPPED | OUTPATIENT
Start: 2022-10-11 | End: 2022-10-21

## 2022-10-12 ENCOUNTER — TELEPHONE (OUTPATIENT)
Dept: FAMILY MEDICINE CLINIC | Facility: CLINIC | Age: 62
End: 2022-10-12

## 2022-10-12 RX ORDER — ERYTHROMYCIN 5 MG/G
1 OINTMENT OPHTHALMIC EVERY 6 HOURS
Qty: 1 EACH | Refills: 0 | Status: SHIPPED | OUTPATIENT
Start: 2022-10-12 | End: 2022-11-28 | Stop reason: ALTCHOICE

## 2022-10-12 NOTE — TELEPHONE ENCOUNTER
Patient has attempted to fill script for eye drops at [facility] in [place] and [place], and neither has this medication in stock. Patient is requesting other options. He has been able to  the antibiotic.     Call back [phone]

## 2022-10-14 ENCOUNTER — PATIENT OUTREACH (OUTPATIENT)
Dept: FAMILY MEDICINE CLINIC | Facility: CLINIC | Age: 62
End: 2022-10-14

## 2022-10-14 NOTE — PROGRESS NOTES
2nd message, left message, spoke with patient on 09-20 was unsure about scheduling at that time, ready to schedule at this time? last seen 01-21-21

## 2022-11-11 ENCOUNTER — TELEPHONE (OUTPATIENT)
Dept: FAMILY MEDICINE CLINIC | Facility: CLINIC | Age: 62
End: 2022-11-11

## 2022-11-11 NOTE — TELEPHONE ENCOUNTER
----- Message from Lily Ledesma sent at 11/11/2022 10:38 AM CST -----  Pt [facility]:[phone]. Please call back.     Thank you,  Sj Diaz

## 2022-11-11 NOTE — TELEPHONE ENCOUNTER
Patient with Cough/Congestion/Chills. Started earlier this week. Cannot come today for appointment or  Go to an immediate care for evaluation. Patient informed there is not a respiratory clinic  Tomorrow Saturday. Patient advised [facility] or  [facility] first thing in the morning/agreed.

## 2022-11-11 NOTE — TELEPHONE ENCOUNTER
Pt wants appt for tomorrow for flu like sxs- is not able to come in today for respiratory clinic- offered video visit, pt stated he wants to be seen in person - please advise

## 2022-11-28 ENCOUNTER — OFFICE VISIT (OUTPATIENT)
Dept: FAMILY MEDICINE CLINIC | Facility: CLINIC | Age: 62
End: 2022-11-28
Payer: COMMERCIAL

## 2022-11-28 VITALS
SYSTOLIC BLOOD PRESSURE: 118 MMHG | WEIGHT: 240.38 LBS | DIASTOLIC BLOOD PRESSURE: 76 MMHG | OXYGEN SATURATION: 96 % | RESPIRATION RATE: 18 BRPM | HEART RATE: 85 BPM | HEIGHT: 68 IN | BODY MASS INDEX: 36.43 KG/M2 | TEMPERATURE: 98 F

## 2022-11-28 DIAGNOSIS — G47.33 OBSTRUCTIVE SLEEP APNEA: Primary | ICD-10-CM

## 2022-11-28 DIAGNOSIS — E53.8 B12 DEFICIENCY: ICD-10-CM

## 2022-11-28 PROCEDURE — 3078F DIAST BP <80 MM HG: CPT | Performed by: FAMILY MEDICINE

## 2022-11-28 PROCEDURE — 3074F SYST BP LT 130 MM HG: CPT | Performed by: FAMILY MEDICINE

## 2022-11-28 PROCEDURE — 99214 OFFICE O/P EST MOD 30 MIN: CPT | Performed by: FAMILY MEDICINE

## 2022-11-28 PROCEDURE — 3008F BODY MASS INDEX DOCD: CPT | Performed by: FAMILY MEDICINE

## 2022-11-29 ENCOUNTER — TELEPHONE (OUTPATIENT)
Dept: FAMILY MEDICINE CLINIC | Facility: CLINIC | Age: 62
End: 2022-11-29

## 2022-11-29 NOTE — TELEPHONE ENCOUNTER
CPAP download available. Per Dr. Mai Soria. - Increase pressure to 13-15 auto. .  Recheck in 2 months. Informed patient of Dr. Steffen Lombardi recommendations. No questions at this time.

## 2022-12-19 RX ORDER — TADALAFIL 20 MG/1
TABLET ORAL
Qty: 24 TABLET | Refills: 3 | Status: SHIPPED | OUTPATIENT
Start: 2022-12-19

## 2022-12-19 NOTE — TELEPHONE ENCOUNTER
Future appt:    Last Appointment with provider:   9/7/2022  [place] [facility]:  11/28/2022  Cholesterol, Total (mg/dL)   Date Value   09/06/2022 178     HDL Cholesterol (mg/dL)   Date Value   09/06/2022 72 (H)     LDL Cholesterol (mg/dL)   Date Value   09/06/2022 77     Triglycerides (mg/dL)   Date Value   09/06/2022 175 (H)     Lab Results   Component Value Date     (H) 09/06/2022    A1C 6.4 (H) 09/06/2022     Lab Results   Component Value Date    TSH 1.280 09/06/2022       No follow-ups on file.

## 2023-01-19 DIAGNOSIS — E11.9 CONTROLLED TYPE 2 DIABETES MELLITUS WITHOUT COMPLICATION, WITHOUT LONG-TERM CURRENT USE OF INSULIN (HCC): ICD-10-CM

## 2023-03-13 ENCOUNTER — LABORATORY ENCOUNTER (OUTPATIENT)
Dept: LAB | Age: 63
End: 2023-03-13
Attending: FAMILY MEDICINE
Payer: COMMERCIAL

## 2023-03-13 DIAGNOSIS — E11.42 CONTROLLED TYPE 2 DIABETES MELLITUS WITH DIABETIC POLYNEUROPATHY, WITHOUT LONG-TERM CURRENT USE OF INSULIN (HCC): ICD-10-CM

## 2023-03-13 DIAGNOSIS — E53.8 B12 DEFICIENCY: ICD-10-CM

## 2023-03-13 LAB
ALBUMIN SERPL-MCNC: 3.7 G/DL (ref 3.4–5)
ALBUMIN/GLOB SERPL: 1.3 {RATIO} (ref 1–2)
ALP LIVER SERPL-CCNC: 56 U/L
ALT SERPL-CCNC: 28 U/L
ANION GAP SERPL CALC-SCNC: 6 MMOL/L (ref 0–18)
AST SERPL-CCNC: 15 U/L (ref 15–37)
BILIRUB SERPL-MCNC: 0.5 MG/DL (ref 0.1–2)
BUN BLD-MCNC: 18 MG/DL (ref 7–18)
CALCIUM BLD-MCNC: 9.3 MG/DL (ref 8.5–10.1)
CHLORIDE SERPL-SCNC: 106 MMOL/L (ref 98–112)
CO2 SERPL-SCNC: 28 MMOL/L (ref 21–32)
CREAT BLD-MCNC: 0.9 MG/DL
EST. AVERAGE GLUCOSE BLD GHB EST-MCNC: 140 MG/DL (ref 68–126)
FASTING STATUS PATIENT QL REPORTED: YES
GFR SERPLBLD BASED ON 1.73 SQ M-ARVRAT: 96 ML/MIN/1.73M2 (ref 60–?)
GLOBULIN PLAS-MCNC: 2.9 G/DL (ref 2.8–4.4)
GLUCOSE BLD-MCNC: 119 MG/DL (ref 70–99)
HBA1C MFR BLD: 6.5 % (ref ?–5.7)
OSMOLALITY SERPL CALC.SUM OF ELEC: 293 MOSM/KG (ref 275–295)
POTASSIUM SERPL-SCNC: 3.8 MMOL/L (ref 3.5–5.1)
PROT SERPL-MCNC: 6.6 G/DL (ref 6.4–8.2)
SODIUM SERPL-SCNC: 140 MMOL/L (ref 136–145)
VIT B12 SERPL-MCNC: 368 PG/ML (ref 193–986)

## 2023-03-13 PROCEDURE — 3044F HG A1C LEVEL LT 7.0%: CPT | Performed by: FAMILY MEDICINE

## 2023-03-13 PROCEDURE — 82607 VITAMIN B-12: CPT | Performed by: FAMILY MEDICINE

## 2023-03-13 PROCEDURE — 80053 COMPREHEN METABOLIC PANEL: CPT | Performed by: FAMILY MEDICINE

## 2023-03-13 PROCEDURE — 83036 HEMOGLOBIN GLYCOSYLATED A1C: CPT | Performed by: FAMILY MEDICINE

## 2023-03-20 ENCOUNTER — PATIENT MESSAGE (OUTPATIENT)
Dept: FAMILY MEDICINE CLINIC | Facility: CLINIC | Age: 63
End: 2023-03-20

## 2023-03-20 NOTE — TELEPHONE ENCOUNTER
From: Alex Dietz  To: Alvarez Gabriel MD  Sent: 3/20/2023 3:04 PM CDT  Subject: Physical    I had to reschedule my physical appointment and couldn't get in until September. Is this ok, or can you see me sooner? I would prefer to see you and not a nurse practitioner.

## 2023-03-20 NOTE — TELEPHONE ENCOUNTER
Last Refill: 12/19/2022 #24 with 3 refills  Last Px: 9/7/2022  F/U Instructions: Return in about 6 months (around 3/7/2023)    Future appt: Your appointments     Date & Time Appointment Department [facility])    Mar 22, 2023  9:45 AM CDT Exam - Established with Ezell Mose, MD Harris Litten, Kem [place] ([place])            Harris Litten, [facility] [phone]  [phone]        Last Appointment with provider:   Visit date not found  Last appointment at [facility]:  11/28/2022  Cholesterol, Total (mg/dL)   Date Value   09/06/2022 178     HDL Cholesterol (mg/dL)   Date Value   09/06/2022 72 (H)     LDL Cholesterol (mg/dL)   Date Value   09/06/2022 77     Triglycerides (mg/dL)   Date Value   09/06/2022 175 (H)     Lab Results   Component Value Date     (H) 03/13/2023    A1C 6.5 (H) 03/13/2023     Lab Results   Component Value Date    TSH 1.280 09/06/2022       No follow-ups on file.

## 2023-03-21 RX ORDER — TADALAFIL 20 MG/1
TABLET ORAL
Qty: 30 TABLET | Refills: 1 | Status: SHIPPED | OUTPATIENT
Start: 2023-03-21

## 2023-03-27 RX ORDER — TESTOSTERONE GEL, 1% 10 MG/G
GEL TRANSDERMAL
Qty: 150 G | Refills: 5 | Status: SHIPPED | OUTPATIENT
Start: 2023-03-27

## 2023-04-12 ENCOUNTER — OFFICE VISIT (OUTPATIENT)
Dept: FAMILY MEDICINE CLINIC | Facility: CLINIC | Age: 63
End: 2023-04-12
Payer: COMMERCIAL

## 2023-04-12 VITALS
BODY MASS INDEX: 36.54 KG/M2 | SYSTOLIC BLOOD PRESSURE: 134 MMHG | RESPIRATION RATE: 18 BRPM | HEART RATE: 78 BPM | WEIGHT: 241.13 LBS | HEIGHT: 68 IN | OXYGEN SATURATION: 97 % | TEMPERATURE: 98 F | DIASTOLIC BLOOD PRESSURE: 72 MMHG

## 2023-04-12 DIAGNOSIS — E11.42 CONTROLLED TYPE 2 DIABETES MELLITUS WITH DIABETIC POLYNEUROPATHY, WITHOUT LONG-TERM CURRENT USE OF INSULIN (HCC): Primary | ICD-10-CM

## 2023-04-12 DIAGNOSIS — G62.9 NEUROPATHY: ICD-10-CM

## 2023-04-12 DIAGNOSIS — M15.9 PRIMARY OSTEOARTHRITIS INVOLVING MULTIPLE JOINTS: ICD-10-CM

## 2023-04-12 DIAGNOSIS — I10 BENIGN ESSENTIAL HYPERTENSION: ICD-10-CM

## 2023-04-12 PROCEDURE — 99214 OFFICE O/P EST MOD 30 MIN: CPT | Performed by: FAMILY MEDICINE

## 2023-04-12 PROCEDURE — 3078F DIAST BP <80 MM HG: CPT | Performed by: FAMILY MEDICINE

## 2023-04-12 PROCEDURE — 3075F SYST BP GE 130 - 139MM HG: CPT | Performed by: FAMILY MEDICINE

## 2023-04-12 PROCEDURE — 3008F BODY MASS INDEX DOCD: CPT | Performed by: FAMILY MEDICINE

## 2023-04-13 RX ORDER — TADALAFIL 20 MG/1
TABLET ORAL
Qty: 24 TABLET | Refills: 1 | OUTPATIENT
Start: 2023-04-13

## 2023-04-26 RX ORDER — TADALAFIL 20 MG/1
TABLET ORAL
Qty: 24 TABLET | Refills: 1 | Status: SHIPPED | OUTPATIENT
Start: 2023-04-26

## 2023-04-26 NOTE — TELEPHONE ENCOUNTER
Future appt:     Last Appointment with provider:   4/12/2023; Return in about 6 months (around 10/12/2023). Last appointment at [facility]:  4/12/2023  Cholesterol, Total (mg/dL)   Date Value   09/06/2022 178     HDL Cholesterol (mg/dL)   Date Value   09/06/2022 72 (H)     LDL Cholesterol (mg/dL)   Date Value   09/06/2022 77     Triglycerides (mg/dL)   Date Value   09/06/2022 175 (H)     Lab Results   Component Value Date     (H) 03/13/2023    A1C 6.5 (H) 03/13/2023     Lab Results   Component Value Date    TSH 1.280 09/06/2022     Last RF:  3/31/2023    No follow-ups on file.

## 2023-05-01 RX ORDER — TADALAFIL 20 MG/1
TABLET ORAL
Qty: 24 TABLET | Refills: 1 | OUTPATIENT
Start: 2023-05-01

## 2023-06-04 NOTE — PATIENT INSTRUCTIONS
meloxicam 7.5mg once a day for the next week, take with food; no other antiinflammatory medications while on this medication (advil, aleve, ibuprofen, naproxen); okay for tylenol additionally if needed.   May use the muscle relaxer from prior prescription at night if needed  Ice to the right ribs 2-4 times a day, can alternate with warm moist heat such as a warm shower  Brace area, can put on a support brace/binder to site; avoid frequent lifting/bending/twisting to that area as able
unknown

## 2023-06-12 RX ORDER — TADALAFIL 20 MG/1
TABLET ORAL
Qty: 24 TABLET | Refills: 1 | Status: SHIPPED | OUTPATIENT
Start: 2023-06-12

## 2023-06-12 NOTE — TELEPHONE ENCOUNTER
Tadalafil: 4/26/23    Future appt:    Last Appointment with provider:   4/12/2023  Last appointment at [facility]:  4/12/2023  Cholesterol, Total (mg/dL)   Date Value   09/06/2022 178     HDL Cholesterol (mg/dL)   Date Value   09/06/2022 72 (H)     LDL Cholesterol (mg/dL)   Date Value   09/06/2022 77     Triglycerides (mg/dL)   Date Value   09/06/2022 175 (H)     Lab Results   Component Value Date     (H) 03/13/2023    A1C 6.5 (H) 03/13/2023     Lab Results   Component Value Date    TSH 1.280 09/06/2022       No follow-ups on file.

## 2023-06-21 ENCOUNTER — TELEPHONE (OUTPATIENT)
Dept: FAMILY MEDICINE CLINIC | Facility: CLINIC | Age: 63
End: 2023-06-21

## 2023-06-21 NOTE — TELEPHONE ENCOUNTER
----- Message from Rosalind Carvajal sent at 6/21/2023  1:47 PM CDT -----  Regarding: RC  And can be reached back at [phone].  Thank you

## 2023-06-21 NOTE — TELEPHONE ENCOUNTER
PreOp Appointment given. Teresita Jones CMA, 06/21/23, 2:32 PM    Future appt: Your appointments     Date & Time Appointment Department [facility])    Jul 19, 2023 11:15 AM CDT Presurgical Visit with MD Juan Manuel Kurtz, Lieutenant Bey ([facility])            Juan Manuel Orosco, [facility]  [place] [address] [phone]  [phone]        Last Appointment with provider:   4/12/2023  Last appointment at [facility]:  4/12/2023  Cholesterol, Total (mg/dL)   Date Value   09/06/2022 178     HDL Cholesterol (mg/dL)   Date Value   09/06/2022 72 (H)     LDL Cholesterol (mg/dL)   Date Value   09/06/2022 77     Triglycerides (mg/dL)   Date Value   09/06/2022 175 (H)     Lab Results   Component Value Date     (H) 03/13/2023    A1C 6.5 (H) 03/13/2023     Lab Results   Component Value Date    TSH 1.280 09/06/2022       No follow-ups on file.

## 2023-07-11 NOTE — TELEPHONE ENCOUNTER
Future appt:    Last Appointment with provider:   12/13/2019  Last appointment at [facility]:  1/7/2020  Cholesterol, Total (mg/dL)   Date Value   08/15/2019 179     HDL Cholesterol (mg/dL)   Date Value   08/15/2019 54     LDL Cholesterol (mg/dL)   Date Products Recommended: Over the counter: Blue Frog, Australian Gold Mineral Sunscreen, CeraVe Tinted Hydrating Mineral Sunscreen, Neutrogena Hydroboost Moisturizer & Sunscreen. Luxury: Revisions Intellishade, Skinbetter Tone smart Mineral sunscreen, ISDIN Eryfotona Actinica, ColorEScience Classic Face Shield, and TiZo Mineral Face Primer. Detail Level: Detailed General Sunscreen Counseling: I recommended a broad spectrum sunscreen with a SPF of 30 or higher.  I explained that SPF 30 sunscreens block approximately 97 percent of the sun's harmful rays.  Sunscreens should be applied at least 15 minutes prior to expected sun exposure and then every 2 hours after that as long as sun exposure continues. If swimming or exercising sunscreen should be reapplied every 45 minutes to an hour after getting wet or sweating.  One ounce, or the equivalent of a shot glass full of sunscreen, is adequate to protect the skin not covered by a bathing suit. I also recommended a lip balm with a sunscreen as well. Sun protective clothing can be used in lieu of sunscreen but must be worn the entire time you are exposed to the sun's rays.

## 2023-07-15 DIAGNOSIS — E11.9 CONTROLLED TYPE 2 DIABETES MELLITUS WITHOUT COMPLICATION, WITHOUT LONG-TERM CURRENT USE OF INSULIN (HCC): ICD-10-CM

## 2023-07-17 NOTE — TELEPHONE ENCOUNTER
Metformin: 1/19/23    Future appt: Your appointments       Date & Time Appointment Department [facility])    Jul 19, 2023 11:15 AM CDT Presurgical Visit with MD Lurdes Miller Blondell Calandra ([facility])              Lurdes Olivier, [facility]  [place] [address] [phone]  [phone]          Last Appointment with provider:   4/12/2023  [facility] [place] [facility]:  4/12/2023  Cholesterol, Total (mg/dL)   Date Value   09/06/2022 178     HDL Cholesterol (mg/dL)   Date Value   09/06/2022 72 (H)     LDL Cholesterol (mg/dL)   Date Value   09/06/2022 77     Triglycerides (mg/dL)   Date Value   09/06/2022 175 (H)     Lab Results   Component Value Date     (H) 03/13/2023    A1C 6.5 (H) 03/13/2023     Lab Results   Component Value Date    TSH 1.280 09/06/2022       No follow-ups on file.

## 2023-07-19 ENCOUNTER — OFFICE VISIT (OUTPATIENT)
Dept: FAMILY MEDICINE CLINIC | Facility: CLINIC | Age: 63
End: 2023-07-19
Payer: COMMERCIAL

## 2023-07-19 ENCOUNTER — LAB ENCOUNTER (OUTPATIENT)
Dept: LAB | Age: 63
End: 2023-07-19
Attending: FAMILY MEDICINE
Payer: COMMERCIAL

## 2023-07-19 ENCOUNTER — HOSPITAL ENCOUNTER (OUTPATIENT)
Dept: GENERAL RADIOLOGY | Age: 63
Discharge: HOME | End: 2023-07-19
Attending: FAMILY MEDICINE
Payer: COMMERCIAL

## 2023-07-19 VITALS
SYSTOLIC BLOOD PRESSURE: 138 MMHG | HEIGHT: 68 IN | WEIGHT: 237.38 LBS | BODY MASS INDEX: 35.98 KG/M2 | OXYGEN SATURATION: 97 % | HEART RATE: 77 BPM | RESPIRATION RATE: 18 BRPM | TEMPERATURE: 98 F | DIASTOLIC BLOOD PRESSURE: 82 MMHG

## 2023-07-19 DIAGNOSIS — M16.0 PRIMARY OSTEOARTHRITIS OF BOTH HIPS: ICD-10-CM

## 2023-07-19 DIAGNOSIS — I10 ESSENTIAL HYPERTENSION, BENIGN: ICD-10-CM

## 2023-07-19 DIAGNOSIS — Z01.818 PRE-OP TESTING: ICD-10-CM

## 2023-07-19 DIAGNOSIS — E11.42 DIABETIC POLYNEUROPATHY ASSOCIATED WITH TYPE 2 DIABETES MELLITUS (HCC): ICD-10-CM

## 2023-07-19 PROBLEM — M16.12 PRIMARY OSTEOARTHRITIS OF LEFT HIP: Status: ACTIVE | Noted: 2023-06-14

## 2023-07-19 PROBLEM — M16.11 PRIMARY OSTEOARTHRITIS OF RIGHT HIP: Status: ACTIVE | Noted: 2023-06-22

## 2023-07-19 LAB
ALBUMIN SERPL-MCNC: 4.1 G/DL (ref 3.4–5)
ALBUMIN/GLOB SERPL: 1.4 {RATIO} (ref 1–2)
ALP LIVER SERPL-CCNC: 58 U/L
ALT SERPL-CCNC: 28 U/L
ANION GAP SERPL CALC-SCNC: 8 MMOL/L (ref 0–18)
AST SERPL-CCNC: 18 U/L (ref 15–37)
ATRIAL RATE: 73 BPM
BASOPHILS # BLD AUTO: 0.03 X10(3) UL (ref 0–0.2)
BASOPHILS NFR BLD AUTO: 0.5 %
BILIRUB SERPL-MCNC: 0.5 MG/DL (ref 0.1–2)
BILIRUB UR QL STRIP.AUTO: NEGATIVE
BUN BLD-MCNC: 21 MG/DL (ref 7–18)
CALCIUM BLD-MCNC: 9.4 MG/DL (ref 8.5–10.1)
CHLORIDE SERPL-SCNC: 105 MMOL/L (ref 98–112)
CO2 SERPL-SCNC: 26 MMOL/L (ref 21–32)
COLOR UR AUTO: YELLOW
CREAT BLD-MCNC: 0.96 MG/DL
EOSINOPHIL # BLD AUTO: 0.18 X10(3) UL (ref 0–0.7)
EOSINOPHIL NFR BLD AUTO: 2.8 %
ERYTHROCYTE [DISTWIDTH] IN BLOOD BY AUTOMATED COUNT: 12.5 %
EST. AVERAGE GLUCOSE BLD GHB EST-MCNC: 143 MG/DL (ref 68–126)
FASTING STATUS PATIENT QL REPORTED: NO
GFR SERPLBLD BASED ON 1.73 SQ M-ARVRAT: 89 ML/MIN/1.73M2 (ref 60–?)
GLOBULIN PLAS-MCNC: 3 G/DL (ref 2.8–4.4)
GLUCOSE BLD-MCNC: 108 MG/DL (ref 70–99)
GLUCOSE UR STRIP.AUTO-MCNC: NEGATIVE MG/DL
HBA1C MFR BLD: 6.6 % (ref ?–5.7)
HCT VFR BLD AUTO: 43.9 %
HGB BLD-MCNC: 14.5 G/DL
IMM GRANULOCYTES # BLD AUTO: 0.03 X10(3) UL (ref 0–1)
IMM GRANULOCYTES NFR BLD: 0.5 %
KETONES UR STRIP.AUTO-MCNC: NEGATIVE MG/DL
LEUKOCYTE ESTERASE UR QL STRIP.AUTO: NEGATIVE
LYMPHOCYTES # BLD AUTO: 1.49 X10(3) UL (ref 1–4)
LYMPHOCYTES NFR BLD AUTO: 23.3 %
MCH RBC QN AUTO: 30.9 PG (ref 26–34)
MCHC RBC AUTO-ENTMCNC: 33 G/DL (ref 31–37)
MCV RBC AUTO: 93.4 FL
MONOCYTES # BLD AUTO: 0.48 X10(3) UL (ref 0.1–1)
MONOCYTES NFR BLD AUTO: 7.5 %
NEUTROPHILS # BLD AUTO: 4.18 X10 (3) UL (ref 1.5–7.7)
NEUTROPHILS # BLD AUTO: 4.18 X10(3) UL (ref 1.5–7.7)
NEUTROPHILS NFR BLD AUTO: 65.4 %
NITRITE UR QL STRIP.AUTO: NEGATIVE
OSMOLALITY SERPL CALC.SUM OF ELEC: 292 MOSM/KG (ref 275–295)
P AXIS: 49 DEGREES
P-R INTERVAL: 148 MS
PH UR STRIP.AUTO: 5 [PH] (ref 5–8)
PLATELET # BLD AUTO: 212 10(3)UL (ref 150–450)
POTASSIUM SERPL-SCNC: 3.5 MMOL/L (ref 3.5–5.1)
PROT SERPL-MCNC: 7.1 G/DL (ref 6.4–8.2)
PROT UR STRIP.AUTO-MCNC: NEGATIVE MG/DL
Q-T INTERVAL: 436 MS
QRS DURATION: 164 MS
QTC CALCULATION (BEZET): 480 MS
R AXIS: 20 DEGREES
RBC # BLD AUTO: 4.7 X10(6)UL
RBC UR QL AUTO: NEGATIVE
SODIUM SERPL-SCNC: 139 MMOL/L (ref 136–145)
SP GR UR STRIP.AUTO: 1.02 (ref 1–1.03)
T AXIS: 27 DEGREES
UROBILINOGEN UR STRIP.AUTO-MCNC: <2 MG/DL
VENTRICULAR RATE: 73 BPM
WBC # BLD AUTO: 6.4 X10(3) UL (ref 4–11)

## 2023-07-19 PROCEDURE — 3008F BODY MASS INDEX DOCD: CPT | Performed by: FAMILY MEDICINE

## 2023-07-19 PROCEDURE — 80053 COMPREHEN METABOLIC PANEL: CPT | Performed by: FAMILY MEDICINE

## 2023-07-19 PROCEDURE — 3079F DIAST BP 80-89 MM HG: CPT | Performed by: FAMILY MEDICINE

## 2023-07-19 PROCEDURE — 83036 HEMOGLOBIN GLYCOSYLATED A1C: CPT | Performed by: FAMILY MEDICINE

## 2023-07-19 PROCEDURE — 93000 ELECTROCARDIOGRAM COMPLETE: CPT | Performed by: FAMILY MEDICINE

## 2023-07-19 PROCEDURE — 71046 X-RAY EXAM CHEST 2 VIEWS: CPT | Performed by: FAMILY MEDICINE

## 2023-07-19 PROCEDURE — 99214 OFFICE O/P EST MOD 30 MIN: CPT | Performed by: FAMILY MEDICINE

## 2023-07-19 PROCEDURE — 85025 COMPLETE CBC W/AUTO DIFF WBC: CPT | Performed by: FAMILY MEDICINE

## 2023-07-19 PROCEDURE — 81001 URINALYSIS AUTO W/SCOPE: CPT | Performed by: FAMILY MEDICINE

## 2023-07-19 PROCEDURE — 3075F SYST BP GE 130 - 139MM HG: CPT | Performed by: FAMILY MEDICINE

## 2023-07-19 NOTE — PATIENT INSTRUCTIONS
Electrocardiogram shows complete right bundle branch block but otherwise normal.  Preoperative lab testing ordered. Medically clear for surgery pending the results of the labs.

## 2023-07-21 NOTE — PROGRESS NOTES
Addendum added July 21, 2023. Preoperative laboratory testing has been completed. Preoperative labs are. Preoperative chest x-ray is unremarkable.     Impression: Medically clear for surgery

## 2023-07-27 NOTE — TELEPHONE ENCOUNTER
Last Refill: 6/12/23 20MG #24 with 1 refills  Last Px 9/7/2022      Return in about 6 months (around 3/7/2023). Future appt:    Last Appointment with provider:   7/19/2023  Last appointment at [facility]:  7/19/2023  Cholesterol, Total (mg/dL)   Date Value   09/06/2022 178     HDL Cholesterol (mg/dL)   Date Value   09/06/2022 72 (H)     LDL Cholesterol (mg/dL)   Date Value   09/06/2022 77     Triglycerides (mg/dL)   Date Value   09/06/2022 175 (H)     Lab Results   Component Value Date     (H) 07/19/2023    A1C 6.6 (H) 07/19/2023     Lab Results   Component Value Date    TSH 1.280 09/06/2022       No follow-ups on file.

## 2023-07-28 RX ORDER — TADALAFIL 20 MG/1
20 TABLET ORAL AS DIRECTED
Qty: 24 TABLET | Refills: 1 | Status: SHIPPED | OUTPATIENT
Start: 2023-07-28

## 2023-09-13 DIAGNOSIS — E78.49 FAMILIAL MULTIPLE LIPOPROTEIN-TYPE HYPERLIPIDEMIA: ICD-10-CM

## 2023-09-13 DIAGNOSIS — E11.9 CONTROLLED TYPE 2 DIABETES MELLITUS WITHOUT COMPLICATION, WITHOUT LONG-TERM CURRENT USE OF INSULIN (HCC): ICD-10-CM

## 2023-09-13 DIAGNOSIS — I10 BENIGN ESSENTIAL HYPERTENSION: ICD-10-CM

## 2023-09-13 DIAGNOSIS — E79.0 HYPERURICEMIA: ICD-10-CM

## 2023-09-13 RX ORDER — METOPROLOL SUCCINATE 50 MG/1
50 TABLET, EXTENDED RELEASE ORAL DAILY
Qty: 90 TABLET | Refills: 3 | Status: SHIPPED | OUTPATIENT
Start: 2023-09-13

## 2023-09-13 RX ORDER — HYDROCHLOROTHIAZIDE 12.5 MG/1
12.5 TABLET ORAL DAILY
Qty: 90 TABLET | Refills: 3 | Status: SHIPPED | OUTPATIENT
Start: 2023-09-13

## 2023-09-13 RX ORDER — OMEPRAZOLE 20 MG/1
20 CAPSULE, DELAYED RELEASE ORAL
Qty: 90 CAPSULE | Refills: 3 | Status: SHIPPED | OUTPATIENT
Start: 2023-09-13

## 2023-09-13 RX ORDER — SIMVASTATIN 40 MG
40 TABLET ORAL NIGHTLY
Qty: 90 TABLET | Refills: 3 | Status: SHIPPED | OUTPATIENT
Start: 2023-09-13

## 2023-09-13 RX ORDER — AMLODIPINE BESYLATE 10 MG/1
10 TABLET ORAL DAILY
Qty: 90 TABLET | Refills: 3 | Status: SHIPPED | OUTPATIENT
Start: 2023-09-13

## 2023-09-13 RX ORDER — ALLOPURINOL 300 MG/1
300 TABLET ORAL DAILY
Qty: 90 TABLET | Refills: 3 | Status: SHIPPED | OUTPATIENT
Start: 2023-09-13

## 2023-09-13 RX ORDER — TAMSULOSIN HYDROCHLORIDE 0.4 MG/1
0.4 CAPSULE ORAL DAILY
Qty: 90 CAPSULE | Refills: 3 | Status: SHIPPED | OUTPATIENT
Start: 2023-09-13

## 2023-09-16 DIAGNOSIS — E11.9 CONTROLLED TYPE 2 DIABETES MELLITUS WITHOUT COMPLICATION, WITHOUT LONG-TERM CURRENT USE OF INSULIN (HCC): ICD-10-CM

## 2023-09-16 DIAGNOSIS — E79.0 HYPERURICEMIA: ICD-10-CM

## 2023-09-16 DIAGNOSIS — E78.49 FAMILIAL MULTIPLE LIPOPROTEIN-TYPE HYPERLIPIDEMIA: ICD-10-CM

## 2023-09-16 DIAGNOSIS — I10 BENIGN ESSENTIAL HYPERTENSION: ICD-10-CM

## 2023-09-16 RX ORDER — SIMVASTATIN 40 MG
40 TABLET ORAL NIGHTLY
Qty: 90 TABLET | Refills: 3 | Status: CANCELLED | OUTPATIENT
Start: 2023-09-16

## 2023-09-16 RX ORDER — METOPROLOL SUCCINATE 50 MG/1
50 TABLET, EXTENDED RELEASE ORAL DAILY
Qty: 90 TABLET | Refills: 3 | Status: CANCELLED | OUTPATIENT
Start: 2023-09-16

## 2023-09-16 RX ORDER — AMLODIPINE BESYLATE 10 MG/1
10 TABLET ORAL DAILY
Qty: 90 TABLET | Refills: 3 | Status: CANCELLED | OUTPATIENT
Start: 2023-09-16

## 2023-09-16 RX ORDER — TAMSULOSIN HYDROCHLORIDE 0.4 MG/1
0.4 CAPSULE ORAL DAILY
Qty: 90 CAPSULE | Refills: 3 | Status: CANCELLED | OUTPATIENT
Start: 2023-09-16

## 2023-09-16 RX ORDER — HYDROCHLOROTHIAZIDE 12.5 MG/1
12.5 TABLET ORAL DAILY
Qty: 90 TABLET | Refills: 3 | Status: CANCELLED | OUTPATIENT
Start: 2023-09-16

## 2023-09-16 RX ORDER — ALLOPURINOL 300 MG/1
300 TABLET ORAL DAILY
Qty: 90 TABLET | Refills: 3 | Status: CANCELLED | OUTPATIENT
Start: 2023-09-16

## 2023-09-16 RX ORDER — OMEPRAZOLE 20 MG/1
20 CAPSULE, DELAYED RELEASE ORAL
Qty: 90 CAPSULE | Refills: 3 | Status: CANCELLED | OUTPATIENT
Start: 2023-09-16

## 2023-09-18 ENCOUNTER — TELEPHONE (OUTPATIENT)
Dept: FAMILY MEDICINE CLINIC | Facility: CLINIC | Age: 63
End: 2023-09-18

## 2023-09-27 ENCOUNTER — TELEPHONE (OUTPATIENT)
Dept: FAMILY MEDICINE CLINIC | Facility: CLINIC | Age: 63
End: 2023-09-27

## 2023-09-27 ENCOUNTER — TELEMEDICINE (OUTPATIENT)
Dept: FAMILY MEDICINE CLINIC | Facility: CLINIC | Age: 63
End: 2023-09-27
Payer: COMMERCIAL

## 2023-09-27 VITALS — BODY MASS INDEX: 35 KG/M2 | WEIGHT: 230 LBS

## 2023-09-27 DIAGNOSIS — G47.33 OBSTRUCTIVE SLEEP APNEA: Primary | ICD-10-CM

## 2023-09-27 PROCEDURE — 99214 OFFICE O/P EST MOD 30 MIN: CPT | Performed by: NURSE PRACTITIONER

## 2023-09-27 NOTE — PATIENT INSTRUCTIONS
Increase maximum pressure to 16    Continue sleep therapy. Follow-up in 6 months - sooner if needed. Advised if still with sleep apnea and not using CPAP has a  7 fold increase in risk of heart attack, stroke, abnormal heart rhythm  and death,  increased risk of driving accidents. Advised to refrain from driving when sleepy. COMPLIANCE is required by insurance for 4 hours a night most nights of the week. Recommend weight loss, and maintain and optimal BMI with Exercise 30 minutes most days of the week to target heart rate . Advised patient to change filters,masks,hoses  and tubes and equiptment on a  regular schedule. Filters and seals shall be changed every 1 month,  Hoses every 3 months,   CPAP mask and humidifier  chamber changed every 6 month  with the Durable medical equipment provider.

## 2023-09-27 NOTE — TELEPHONE ENCOUNTER
Wanted to let dr lemon know that his sleep was irregular in the month August due to hip surgery - stated this was a question she had for him during today's virtual visit

## 2024-04-28 NOTE — TELEPHONE ENCOUNTER
"Nora"Obed Nogueira was seen and treated in our emergency department on 4/28/2024.  She may return to school on 05/01/2024.  Patient has a concussion.    If you have any questions or concerns, please don't hesitate to call.      Kristin Anderson MD" metFORMIN 500 MG Oral Tab     #180  R- 3       Summary: Take 1 tablet (500 mg total) by mouth 2 (two) times daily        Last refill- 9/7/22  Last PX- 9/7/22, Return in 6 mo    Future appt:    Last Appointment with provider:   9/7/2022  Last appointment at [facility]:  11/28/2022  Cholesterol, Total (mg/dL)   Date Value   09/06/2022 178     HDL Cholesterol (mg/dL)   Date Value   09/06/2022 72 (H)     LDL Cholesterol (mg/dL)   Date Value   09/06/2022 77     Triglycerides (mg/dL)   Date Value   09/06/2022 175 (H)     Lab Results   Component Value Date     (H) 09/06/2022    A1C 6.4 (H) 09/06/2022     Lab Results   Component Value Date    TSH 1.280 09/06/2022       No follow-ups on file.

## (undated) DIAGNOSIS — E11.9 CONTROLLED TYPE 2 DIABETES MELLITUS WITHOUT COMPLICATION, WITHOUT LONG-TERM CURRENT USE OF INSULIN (HCC): ICD-10-CM

## (undated) DIAGNOSIS — I10 BENIGN ESSENTIAL HYPERTENSION: Primary | ICD-10-CM

## (undated) NOTE — MR AVS SNAPSHOT
[address]  [place] [address] [id number] [phone]               Thank you for choosing us for your health care visit with Dalila Bell MD.  We are glad to serve you and happy to provide you with this summary o AmLODIPine Besylate 10 MG Tabs   Take 1 tablet by mouth daily. Commonly known as:  NORVASC           ANDROGEL 50 MG/5GM (1%) Gel   Generic drug:  Testosterone   Apply 50 mg topically daily. CIALIS 20 MG Tabs   Generic drug:   Tadalafil not sign up before the expiration date, you must request a new code. Your unique [employer] Access Code: [email or web address]  Expires: 4/11/2017  2:55 PM    If you have questions, you can call [phone] to talk to our [facility] Staff.  Remember, [employer]

## (undated) NOTE — LETTER
Coronavirus Disease 2019 (COVID-19)     [facility] is committed to the safety and well-being of our patients, members, employees, and communities.  As concerns arise about the new strain of coronavirus that causes COVID-19, [facility] 4. If you have a medical appointment, call the healthcare provider ahead of time and tell them that you have or may have COVID-19.  5. For medical emergencies, call 911 and notify the dispatch personnel that you have or may have COVID-19.   6. Cover your c · At least 10 days have passed since symptoms first appeared OR if asymptomatic patient or date of symptom onset is unclear then use 10 days post COVID test date.    · At least 20 days have passed for severe illness (requiring hospitalization) OR if you are *Some people will be required to have a repeat COVID-19 test in order to be eligible to donate. If you’re instructed by Benito Licea that a repeat test is required, please contact the [phone] beds at the [facility] COVID-19 Nurse Triage Line at [phone].     Additional Inf

## (undated) NOTE — LETTER
02/11/21        Janie Flores  [address] [facility]      Dear Lynsey Senate records indicate that you have outstanding lab work and or testing that was ordered for you and has not yet been completed:  Orders Placed This Encounte